# Patient Record
Sex: FEMALE | Race: BLACK OR AFRICAN AMERICAN | Employment: FULL TIME | URBAN - METROPOLITAN AREA
[De-identification: names, ages, dates, MRNs, and addresses within clinical notes are randomized per-mention and may not be internally consistent; named-entity substitution may affect disease eponyms.]

---

## 2017-03-01 ENCOUNTER — OP HISTORICAL/CONVERTED ENCOUNTER (OUTPATIENT)
Dept: OTHER | Age: 52
End: 2017-03-01

## 2020-03-06 ENCOUNTER — OP HISTORICAL/CONVERTED ENCOUNTER (OUTPATIENT)
Dept: OTHER | Age: 55
End: 2020-03-06

## 2020-04-02 ENCOUNTER — OP HISTORICAL/CONVERTED ENCOUNTER (OUTPATIENT)
Dept: OTHER | Age: 55
End: 2020-04-02

## 2020-05-01 ENCOUNTER — OP HISTORICAL/CONVERTED ENCOUNTER (OUTPATIENT)
Dept: OTHER | Age: 55
End: 2020-05-01

## 2020-08-03 ENCOUNTER — OFFICE VISIT (OUTPATIENT)
Dept: OBGYN CLINIC | Age: 55
End: 2020-08-03
Payer: COMMERCIAL

## 2020-08-03 VITALS
WEIGHT: 271 LBS | BODY MASS INDEX: 36.7 KG/M2 | HEIGHT: 72 IN | SYSTOLIC BLOOD PRESSURE: 120 MMHG | DIASTOLIC BLOOD PRESSURE: 82 MMHG

## 2020-08-03 DIAGNOSIS — R10.2 PELVIC PAIN: Primary | ICD-10-CM

## 2020-08-03 DIAGNOSIS — R31.9 URINARY TRACT INFECTION WITH HEMATURIA, SITE UNSPECIFIED: ICD-10-CM

## 2020-08-03 DIAGNOSIS — N39.0 URINARY TRACT INFECTION WITH HEMATURIA, SITE UNSPECIFIED: ICD-10-CM

## 2020-08-03 DIAGNOSIS — N76.0 ACUTE VAGINITIS: ICD-10-CM

## 2020-08-03 DIAGNOSIS — R30.0 DYSURIA: ICD-10-CM

## 2020-08-03 LAB
BILIRUB UR QL STRIP: NEGATIVE
GLUCOSE UR-MCNC: NEGATIVE MG/DL
KETONES P FAST UR STRIP-MCNC: NEGATIVE MG/DL
PH UR STRIP: 7 [PH] (ref 4.6–8)
PROT UR QL STRIP: NEGATIVE
SP GR UR STRIP: 1.02 (ref 1–1.03)
UA UROBILINOGEN AMB POC: NORMAL (ref 0.2–1)
URINALYSIS CLARITY POC: CLEAR
URINALYSIS COLOR POC: YELLOW
URINE BLOOD POC: NORMAL
URINE LEUKOCYTES POC: NORMAL
URINE NITRITES POC: NEGATIVE

## 2020-08-03 PROCEDURE — 81001 URINALYSIS AUTO W/SCOPE: CPT | Performed by: OBSTETRICS & GYNECOLOGY

## 2020-08-03 PROCEDURE — 99203 OFFICE O/P NEW LOW 30 MIN: CPT | Performed by: OBSTETRICS & GYNECOLOGY

## 2020-08-03 RX ORDER — PHENOL/SODIUM PHENOLATE
40 AEROSOL, SPRAY (ML) MUCOUS MEMBRANE DAILY
COMMUNITY

## 2020-08-03 RX ORDER — PHENAZOPYRIDINE HYDROCHLORIDE 200 MG/1
200 TABLET, FILM COATED ORAL
Qty: 9 TAB | Refills: 0 | Status: SHIPPED | OUTPATIENT
Start: 2020-08-03 | End: 2020-08-06

## 2020-08-03 RX ORDER — MONTELUKAST SODIUM 10 MG/1
10 TABLET ORAL DAILY
COMMUNITY

## 2020-08-03 RX ORDER — ALBUTEROL SULFATE 2.5 MG/.5ML
SOLUTION RESPIRATORY (INHALATION) ONCE
COMMUNITY

## 2020-08-03 RX ORDER — HYDROCHLOROTHIAZIDE 25 MG/1
25 TABLET ORAL DAILY
COMMUNITY

## 2020-08-03 RX ORDER — TERCONAZOLE 4 MG/G
1 CREAM VAGINAL
Qty: 45 G | Refills: 0 | Status: SHIPPED | OUTPATIENT
Start: 2020-08-03

## 2020-08-03 RX ORDER — CIPROFLOXACIN 500 MG/1
500 TABLET ORAL 2 TIMES DAILY
Qty: 20 TAB | Refills: 0 | Status: SHIPPED | OUTPATIENT
Start: 2020-08-03 | End: 2020-08-13

## 2020-08-03 NOTE — PATIENT INSTRUCTIONS
Vaginal Yeast Infection: Care Instructions Your Care Instructions A vaginal yeast infection is caused by too many yeast cells in the vagina. This is common in women of all ages. Itching, vaginal discharge and irritation, and other symptoms can bother you. But yeast infections don't often cause other health problems. Some medicines can increase your risk of getting a yeast infection. These include antibiotics, birth control pills, hormones, and steroids. You may also be more likely to get a yeast infection if you are pregnant, have diabetes, douche, or wear tight clothes. With treatment, most yeast infections get better in 2 to 3 days. Follow-up care is a key part of your treatment and safety. Be sure to make and go to all appointments, and call your doctor if you are having problems. It's also a good idea to know your test results and keep a list of the medicines you take. How can you care for yourself at home? · Take your medicines exactly as prescribed. Call your doctor if you think you are having a problem with your medicine. · Ask your doctor about over-the-counter (OTC) medicines for yeast infections. They may cost less than prescription medicines. If you use an OTC treatment, read and follow all instructions on the label. · Do not use tampons while using a vaginal cream or suppository. The tampons can absorb the medicine. Use pads instead. · Wear loose cotton clothing. Do not wear nylon or other fabric that holds body heat and moisture close to the skin. · Try sleeping without underwear. · Do not scratch. Relieve itching with a cold pack or a cool bath. · Do not wash your vaginal area more than once a day. Use plain water or a mild, unscented soap. Air-dry the vaginal area. · Change out of wet swimsuits after swimming. · Do not have sex until you have finished your treatment. · Do not douche. When should you call for help? Call your doctor now or seek immediate medical care if: · You have unexpected vaginal bleeding. · You have new or increased pain in your vagina or pelvis. Watch closely for changes in your health, and be sure to contact your doctor if: 
· You have a fever. · You are not getting better after 2 days. · Your symptoms come back after you finish your medicines. Where can you learn more? Go to http://crissy-kris.info/ Enter K780 in the search box to learn more about \"Vaginal Yeast Infection: Care Instructions. \" Current as of: November 8, 2019               Content Version: 12.5 © 4218-4655 [employer]. Care instructions adapted under license by [employer] (which disclaims liability or warranty for this information). If you have questions about a medical condition or this instruction, always ask your healthcare professional. [address] any warranty or liability for your use of this information. Pelvic Pain: Care Instructions Your Care Instructions Pelvic pain, or pain in the lower belly, can have many causes. Often pelvic pain is not serious and gets better in a few days. If your pain continues or gets worse, you may need tests and treatment. Tell your doctor about any new symptoms. These may be signs of a serious problem. Follow-up care is a key part of your treatment and safety. Be sure to make and go to all appointments, and call your doctor if you are having problems. It's also a good idea to know your test results and keep a list of the medicines you take. How can you care for yourself at home? · Rest until you feel better. Lie down, and raise your legs by placing a pillow under your knees. · Drink plenty of fluids. You may find that small, frequent sips are easier on your stomach than if you drink a lot at once. Avoid drinks with carbonation or caffeine, such as soda pop, tea, or coffee. · Try eating several small meals instead of 2 or 3 large ones.  Eat mild foods, such as rice, dry toast or crackers, bananas, and applesauce. Avoid fatty and spicy foods, other fruits, and alcohol until 48 hours after your symptoms have gone away. · Take an over-the-counter pain medicine, such as acetaminophen (Tylenol), ibuprofen (Advil, Motrin), or naproxen (Aleve). Read and follow all instructions on the label. · Do not take two or more pain medicines at the same time unless the doctor told you to. Many pain medicines have acetaminophen, which is Tylenol. Too much acetaminophen (Tylenol) can be harmful. · You can put a heating pad, a warm cloth, or moist heat on your belly to relieve pain. When should you call for help? Call your doctor now or seek immediate medical care if: 
· You have a new or higher fever. · You have unusual vaginal bleeding. · You have new or worse belly or pelvic pain. · You have vaginal discharge that has increased in amount or smells bad. Watch closely for changes in your health, and be sure to contact your doctor if: 
· You do not get better as expected. Where can you learn more? Go to http://crissy-kris.info/ Enter [phone] in the search box to learn more about \"Pelvic Pain: Care Instructions. \" Current as of: November 8, 2019               Content Version: 12.5 © 3222-0624 [employer]. Care instructions adapted under license by [employer] (which disclaims liability or warranty for this information). If you have questions about a medical condition or this instruction, always ask your healthcare professional. [place] [address] any warranty or liability for your use of this information.

## 2020-08-03 NOTE — PROGRESS NOTES
Tyler Garcia is a 62 y.o. female, No obstetric history on file., No LMP recorded. Patient has had a hysterectomy. , who presents today for the following:  Chief Complaint   Patient presents with    Pelvic Pain     Pain on right side. No Known Allergies    Current Outpatient Medications   Medication Sig    albuterol sulfate (PROVENTIL;VENTOLIN) 2.5 mg/0.5 mL nebu nebulizer solution by Nebulization route once.  montelukast (Singulair) 10 mg tablet Take 10 mg by mouth daily.  hydroCHLOROthiazide (HYDRODIURIL) 25 mg tablet Take 25 mg by mouth daily.  Omeprazole delayed release (PRILOSEC D/R) 20 mg tablet Take 40 mg by mouth daily.  ciprofloxacin HCl (CIPRO) 500 mg tablet Take 1 Tab by mouth two (2) times a day for 10 days.  phenazopyridine (PYRIDIUM) 200 mg tablet Take 1 Tab by mouth three (3) times daily as needed for Pain for up to 3 days.  terconazole (TERAZOL 7) 0.4 % vaginal cream Insert 1 Applicator into vagina nightly. No current facility-administered medications for this visit.         Past Medical History:   Diagnosis Date    History of total abdominal hysterectomy        Past Surgical History:   Procedure Laterality Date    HX TOTAL ABDOMINAL HYSTERECTOMY         Family History   Problem Relation Age of Onset    Breast Cancer Mother     Lung Cancer Mother     Hypertension Father     Diabetes Father        Social History     Socioeconomic History    Marital status: UNKNOWN     Spouse name: Not on file    Number of children: Not on file    Years of education: Not on file    Highest education level: Not on file   Occupational History    Not on file   Social Needs    Financial resource strain: Not on file    Food insecurity     Worry: Not on file     Inability: Not on file    Transportation needs     Medical: Not on file     Non-medical: Not on file   Tobacco Use    Smoking status: Never Smoker    Smokeless tobacco: Never Used   Substance and Sexual Activity    Alcohol use: Not Currently    Drug use: Never    Sexual activity: Yes     Partners: Male     Birth control/protection: Surgical   Lifestyle    Physical activity     Days per week: Not on file     Minutes per session: Not on file    Stress: Not on file   Relationships    Social connections     Talks on phone: Not on file     Gets together: Not on file     Attends Yarsani service: Not on file     Active member of club or organization: Not on file     Attends meetings of clubs or organizations: Not on file     Relationship status: Not on file    Intimate partner violence     Fear of current or ex partner: Not on file     Emotionally abused: Not on file     Physically abused: Not on file     Forced sexual activity: Not on file   Other Topics Concern    Not on file   Social History Narrative    Not on file         HPI   HPI  Reported by patient. Location: lower right abdominal   Onset/Timing: 3 months  Duration: 3 months  Quality: aching; cramping; pressure; fullness/bloating , daily  Severity: interferes with normal activities   Alleviating Factors:  Rest  Aggravating Factors: urination, movement  Associated Symptoms: no back pain; no chills; no constipation; no diarrhea; no vaginal discharge; no pain with urination; normal emptying of bladder; no feelings of urgency; + urinary frequency, no blood in the urine; normal libido; no fever; no nausea; no vomiting; no nocturia; no sexual abuse; no ectopic pregnancies; no endometriosis; no urinary frequency; no vaginal itching or irritation; no dyspareunia; Review of Systems   Constitutional: Negative. Respiratory: Negative. Cardiovascular: Negative. Gastrointestinal: Positive for abdominal pain. Genitourinary: Positive for dysuria, frequency and urgency. Pelvic pain   Musculoskeletal: Negative. Skin: Negative. Neurological: Negative. Endo/Heme/Allergies: Negative. Psychiatric/Behavioral: Negative.     All other systems reviewed and are negative. /82 (BP 1 Location: Left arm, BP Patient Position: Sitting)   Ht 6' (1.829 m)   Wt 271 lb (122.9 kg)   BMI 36.75 kg/m²    OBGyn Exam PE:  Constitutional: General Appearance: healthy-appearing, well-nourished, well-developed, and well groomed. Psychiatric: Orientation: to time, place, and person. Mood and Affect: normal mood and affect and appropriate and active and alert. Abdomen: Auscultation/Inspection/Palpation: +tenderness in RLQ and mass and non-distended. Hernia: none palpated. Female Genitalia: Vulva: no masses, atrophy, or lesions. Bladder/Urethra: no urethral discharge or mass and normal meatus and bladder non distended. Vagina no tenderness, erythema, cystocele, rectocele, + abnormal vaginal discharge, or vesicle(s) or ulcers. Cervix: absent     Uterus: absent    Adnexa/Parametria: no adnexal tenderness. 1. Pelvic pain    - US PELV NON OBS; Future    2. Dysuria    - CULTURE, URINE; Future    3. Urinary tract infection with hematuria, site unspecified      4. Acute vaginitis    - terconazole (TERAZOL 7) 0.4 % vaginal cream; Insert 1 Applicator into vagina nightly. Dispense: 45 g; Refill: 0  - NUSWAB VAGINITIS PLUS (VG+) WITH CANDIDA (SIX SPECIES)        Follow-up and Dispositions    · Return in about 4 weeks (around 8/31/2020) for gyn.

## 2020-08-09 LAB
A VAGINAE DNA VAG QL NAA+PROBE: ABNORMAL SCORE
BVAB2 DNA VAG QL NAA+PROBE: ABNORMAL SCORE
C ALBICANS DNA VAG QL NAA+PROBE: NEGATIVE
C GLABRATA DNA VAG QL NAA+PROBE: POSITIVE
C KRUSEI DNA VAG QL NAA+PROBE: NEGATIVE
C LUSITANIAE DNA VAG QL NAA+PROBE: NEGATIVE
C TRACH DNA VAG QL NAA+PROBE: NEGATIVE
CANDIDA DNA VAG QL NAA+PROBE: NEGATIVE
MEGA1 DNA VAG QL NAA+PROBE: ABNORMAL SCORE
N GONORRHOEA DNA VAG QL NAA+PROBE: NEGATIVE
T VAGINALIS DNA VAG QL NAA+PROBE: NEGATIVE

## 2020-08-13 ENCOUNTER — OP HISTORICAL/CONVERTED ENCOUNTER (OUTPATIENT)
Dept: OTHER | Age: 55
End: 2020-08-13

## 2020-09-08 ENCOUNTER — ED HISTORICAL/CONVERTED ENCOUNTER (OUTPATIENT)
Dept: OTHER | Age: 55
End: 2020-09-08

## 2020-09-13 ENCOUNTER — APPOINTMENT (OUTPATIENT)
Dept: GENERAL RADIOLOGY | Age: 55
End: 2020-09-13
Attending: EMERGENCY MEDICINE
Payer: COMMERCIAL

## 2020-09-13 ENCOUNTER — HOSPITAL ENCOUNTER (EMERGENCY)
Age: 55
Discharge: HOME | End: 2020-09-13
Attending: EMERGENCY MEDICINE
Payer: COMMERCIAL

## 2020-09-13 ENCOUNTER — APPOINTMENT (OUTPATIENT)
Dept: CT IMAGING | Age: 55
End: 2020-09-13
Attending: EMERGENCY MEDICINE
Payer: COMMERCIAL

## 2020-09-13 VITALS
HEART RATE: 69 BPM | BODY MASS INDEX: 37.93 KG/M2 | WEIGHT: 280 LBS | SYSTOLIC BLOOD PRESSURE: 110 MMHG | OXYGEN SATURATION: 97 % | HEIGHT: 72 IN | TEMPERATURE: 98 F | DIASTOLIC BLOOD PRESSURE: 72 MMHG | RESPIRATION RATE: 15 BRPM

## 2020-09-13 DIAGNOSIS — T83.9XXA COMPLICATION OF URINARY STENT, INITIAL ENCOUNTER (HCC): Primary | ICD-10-CM

## 2020-09-13 DIAGNOSIS — R31.9 URINARY TRACT INFECTION WITH HEMATURIA, SITE UNSPECIFIED: ICD-10-CM

## 2020-09-13 DIAGNOSIS — N39.0 URINARY TRACT INFECTION WITH HEMATURIA, SITE UNSPECIFIED: ICD-10-CM

## 2020-09-13 LAB
ALBUMIN SERPL-MCNC: 3.3 G/DL (ref 3.5–5)
ALBUMIN/GLOB SERPL: 0.8 {RATIO} (ref 1.1–2.2)
ALP SERPL-CCNC: 67 U/L (ref 45–117)
ALT SERPL-CCNC: 26 U/L (ref 12–78)
ANION GAP SERPL CALC-SCNC: 10 MMOL/L (ref 5–15)
AST SERPL W P-5'-P-CCNC: 25 U/L (ref 15–37)
BASOPHILS # BLD: 0 K/UL (ref 0–0.1)
BASOPHILS NFR BLD: 0 % (ref 0–1)
BILIRUB SERPL-MCNC: 0.5 MG/DL (ref 0.2–1)
BUN SERPL-MCNC: 25 MG/DL (ref 6–20)
BUN/CREAT SERPL: 27 (ref 12–20)
CA-I BLD-MCNC: 9.1 MG/DL (ref 8.5–10.1)
CHLORIDE SERPL-SCNC: 100 MMOL/L (ref 97–108)
CO2 SERPL-SCNC: 30 MMOL/L (ref 21–32)
CREAT SERPL-MCNC: 0.91 MG/DL (ref 0.55–1.02)
DIFFERENTIAL METHOD BLD: NORMAL
EOSINOPHIL # BLD: 0.1 K/UL (ref 0–0.4)
EOSINOPHIL NFR BLD: 1 % (ref 0–7)
ERYTHROCYTE [DISTWIDTH] IN BLOOD BY AUTOMATED COUNT: 14.2 % (ref 11.5–14.5)
GLOBULIN SER CALC-MCNC: 4.2 G/DL (ref 2–4)
GLUCOSE SERPL-MCNC: 92 MG/DL (ref 65–100)
HCT VFR BLD AUTO: 36.9 % (ref 35–47)
HGB BLD-MCNC: 12 % (ref 11.5–16)
IMM GRANULOCYTES # BLD AUTO: 0 K/UL (ref 0–0.04)
IMM GRANULOCYTES NFR BLD AUTO: 0 % (ref 0–0.5)
LYMPHOCYTES # BLD: 3.1 K/UL (ref 0.8–3.5)
LYMPHOCYTES NFR BLD: 38 % (ref 12–49)
MCH RBC QN AUTO: 28.9 PG (ref 26–34)
MCHC RBC AUTO-ENTMCNC: 32.5 G/DL (ref 30–36.5)
MCV RBC AUTO: 88.9 FL (ref 80–99)
MONOCYTES # BLD: 0.8 K/UL (ref 0–1)
MONOCYTES NFR BLD: 9 % (ref 5–13)
NEUTS SEG # BLD: 4.3 K/UL (ref 1.8–8)
NEUTS SEG NFR BLD: 52 % (ref 32–75)
PLATELET # BLD AUTO: 271 K/UL (ref 150–400)
PMV BLD AUTO: 10.6 FL (ref 8.9–12.9)
POTASSIUM SERPL-SCNC: 3 MMOL/L (ref 3.5–5.1)
PROT SERPL-MCNC: 7.5 G/DL (ref 6.4–8.2)
RBC # BLD AUTO: 4.15 M/UL (ref 3.8–5.2)
SODIUM SERPL-SCNC: 140 MMOL/L (ref 136–145)
WBC # BLD AUTO: 8.3 K/UL (ref 3.6–11)

## 2020-09-13 PROCEDURE — 74018 RADEX ABDOMEN 1 VIEW: CPT

## 2020-09-13 PROCEDURE — 80053 COMPREHEN METABOLIC PANEL: CPT

## 2020-09-13 PROCEDURE — 99284 EMERGENCY DEPT VISIT MOD MDM: CPT

## 2020-09-13 PROCEDURE — 74176 CT ABD & PELVIS W/O CONTRAST: CPT

## 2020-09-13 PROCEDURE — 85025 COMPLETE CBC W/AUTO DIFF WBC: CPT

## 2020-09-13 PROCEDURE — 36415 COLL VENOUS BLD VENIPUNCTURE: CPT

## 2020-09-13 RX ORDER — CEPHALEXIN 500 MG/1
500 CAPSULE ORAL 3 TIMES DAILY
Qty: 21 CAP | Refills: 0 | Status: SHIPPED | OUTPATIENT
Start: 2020-09-13 | End: 2020-09-20

## 2020-09-13 NOTE — ED PROVIDER NOTES
77-year-old female that had a stent placed 1 week ago for ureteral lithiasis [place].  She was told to remove it 2 days ago by pulling on the string when she did so the string broke now she is complaining of urinary incontinence. She denies pain hematuria fever chills or sweats back pain abdominal pain or any other constitutional symptoms.            Past Medical History:   Diagnosis Date    Asthma     History of total abdominal hysterectomy        Past Surgical History:   Procedure Laterality Date    HX TOTAL ABDOMINAL HYSTERECTOMY      IR CHANGE INTERNAL URETERAL STENT SI           Family History:   Problem Relation Age of Onset    Breast Cancer Mother     Lung Cancer Mother     Hypertension Father     Diabetes Father        Social History     Socioeconomic History    Marital status:      Spouse name: Not on file    Number of children: Not on file    Years of education: Not on file    Highest education level: Not on file   Occupational History    Not on file   Social Needs    Financial resource strain: Not on file    Food insecurity     Worry: Not on file     Inability: Not on file    Transportation needs     Medical: Not on file     Non-medical: Not on file   Tobacco Use    Smoking status: Never Smoker    Smokeless tobacco: Never Used   Substance and Sexual Activity    Alcohol use: Not Currently    Drug use: Never    Sexual activity: Yes     Partners: Male     Birth control/protection: Surgical   Lifestyle    Physical activity     Days per week: Not on file     Minutes per session: Not on file    Stress: Not on file   Relationships    Social connections     Talks on phone: Not on file     Gets together: Not on file     Attends Confucianist service: Not on file     Active member of club or organization: Not on file     Attends meetings of clubs or organizations: Not on file     Relationship status: Not on file    Intimate partner violence     Fear of current or ex partner: Not on file     Emotionally abused: Not on file     Physically abused: Not on file     Forced sexual activity: Not on file   Other Topics Concern    Not on file   Social History Narrative    Not on file         ALLERGIES: Patient has no known allergies. Review of Systems   Constitutional: Negative for activity change, chills and fever. HENT: Negative for ear pain, nosebleeds, rhinorrhea and sore throat. Eyes: Negative for pain and visual disturbance. Respiratory: Negative for chest tightness and wheezing. Cardiovascular: Negative for chest pain and palpitations. Gastrointestinal: Negative for abdominal pain, diarrhea, nausea and vomiting. Endocrine: Negative for heat intolerance, polydipsia and polyuria. Genitourinary: Positive for flank pain and pelvic pain. Negative for difficulty urinating, dysuria, frequency, hematuria, vaginal discharge and vaginal pain. Musculoskeletal: Negative for back pain, joint swelling and neck pain. Skin: Negative for rash. Allergic/Immunologic: Negative. Neurological: Negative for dizziness, seizures, syncope, weakness and headaches. Hematological: Negative for adenopathy. Psychiatric/Behavioral: Negative for behavioral problems and suicidal ideas. The patient is not nervous/anxious. Vitals:    09/13/20 0146   BP: (!) 141/85   Pulse: 74   Resp: 16   Temp: 98 °F (36.7 °C)   SpO2: 99%   Weight: 127 kg (280 lb)   Height: 5' 11.5\" (1.816 m)            Physical Exam  Vitals signs and nursing note reviewed. Constitutional:       General: She is not in acute distress. Appearance: Normal appearance. HENT:      Head: Normocephalic and atraumatic. Nose: Nose normal.      Mouth/Throat:      Mouth: Mucous membranes are moist.   Eyes:      Extraocular Movements: Extraocular movements intact. Pupils: Pupils are equal, round, and reactive to light. Neck:      Musculoskeletal: Normal range of motion and neck supple. Cardiovascular:      Rate and Rhythm: Normal rate and regular rhythm. Pulses: Normal pulses. Pulmonary:      Effort: Pulmonary effort is normal. No respiratory distress. Breath sounds: Normal breath sounds. Abdominal:      General: Abdomen is flat. Bowel sounds are normal. There is no distension. Palpations: Abdomen is soft. Tenderness: There is no abdominal tenderness. Musculoskeletal: Normal range of motion. General: No swelling, tenderness or deformity. Right lower leg: No edema. Left lower leg: No edema. Skin:     General: Skin is warm and dry. Capillary Refill: Capillary refill takes less than 2 seconds. Neurological:      General: No focal deficit present. Mental Status: She is alert and oriented to person, place, and time. Mental status is at baseline.    Psychiatric:         Mood and Affect: Mood normal.         Behavior: Behavior normal.          MDM  Number of Diagnoses or Management Options     Amount and/or Complexity of Data Reviewed  Clinical lab tests: ordered and reviewed  Tests in the radiology section of CPT®: ordered and reviewed           Procedures

## 2020-09-13 NOTE — ED TRIAGE NOTES
Urinary stent placed for kidney stone and when she went to remove stent only the string came out and now she is incontinent.

## 2020-09-13 NOTE — ED NOTES
EMERGENCY DEPARTMENT HISTORY AND PHYSICAL EXAM      Date: 9/13/2020  Patient Name: Soni Yanez    History of Presenting Illness     Chief Complaint   Patient presents with    (LUTS) Lower Urinary Tract Symptoms       History Provided By: Patient    HPI: Soni Yanez, 62 y.o. female with a past medical history significant No significant past medical history presents to the ED with cc of urethral stent string came undone when trying to remove. See Dr Johnnie Avila preious note for further. There are no other complaints, changes, or physical findings at this time. PCP: Yadi Godinez MD    No current facility-administered medications on file prior to encounter. Current Outpatient Medications on File Prior to Encounter   Medication Sig Dispense Refill    albuterol sulfate (PROVENTIL;VENTOLIN) 2.5 mg/0.5 mL nebu nebulizer solution by Nebulization route once.  montelukast (Singulair) 10 mg tablet Take 10 mg by mouth daily.  hydroCHLOROthiazide (HYDRODIURIL) 25 mg tablet Take 25 mg by mouth daily.  Omeprazole delayed release (PRILOSEC D/R) 20 mg tablet Take 40 mg by mouth daily.  terconazole (TERAZOL 7) 0.4 % vaginal cream Insert 1 Applicator into vagina nightly.  45 g 0       Past History     Past Medical History:  Past Medical History:   Diagnosis Date    Asthma     History of total abdominal hysterectomy        Past Surgical History:  Past Surgical History:   Procedure Laterality Date    HX TOTAL ABDOMINAL HYSTERECTOMY      IR CHANGE INTERNAL URETERAL STENT SI         Family History:  Family History   Problem Relation Age of Onset    Breast Cancer Mother     Lung Cancer Mother     Hypertension Father     Diabetes Father        Social History:  Social History     Tobacco Use    Smoking status: Never Smoker    Smokeless tobacco: Never Used   Substance Use Topics    Alcohol use: Not Currently    Drug use: Never       Allergies:  No Known Allergies      Review of Systems Review of Systems   Constitutional: Negative for activity change. Genitourinary: Positive for hematuria (incontinence. FB stent in place. ). All other systems reviewed and are negative. Physical Exam   Physical Exam  Vitals signs and nursing note reviewed. Constitutional:       Appearance: Normal appearance. She is obese. HENT:      Head: Normocephalic and atraumatic. Mouth/Throat:      Mouth: Mucous membranes are moist.   Eyes:      Pupils: Pupils are equal, round, and reactive to light. Cardiovascular:      Rate and Rhythm: Normal rate and regular rhythm. Pulmonary:      Effort: Pulmonary effort is normal.   Genitourinary:     General: Normal vulva. Comments: Stent still intact from the urethra. Musculoskeletal: Normal range of motion. General: No swelling or tenderness. Skin:     General: Skin is warm and dry. Capillary Refill: Capillary refill takes less than 2 seconds. Neurological:      General: No focal deficit present. Mental Status: She is alert and oriented to person, place, and time. Diagnostic Study Results     Labs -     Recent Results (from the past 12 hour(s))   CBC WITH AUTOMATED DIFF    Collection Time: 09/13/20  6:15 AM   Result Value Ref Range    WBC 8.3 3.6 - 11.0 K/uL    RBC 4.15 3.80 - 5.20 M/uL    HGB 12.0 11.5 - 16.0 %    HCT 36.9 35.0 - 47.0 %    MCV 88.9 80.0 - 99.0 FL    MCH 28.9 26.0 - 34.0 PG    MCHC 32.5 30.0 - 36.5 g/dL    RDW 14.2 11.5 - 14.5 %    PLATELET 882 069 - 939 K/uL    MPV 10.6 8.9 - 12.9 FL    NEUTROPHILS 52 32 - 75 %    LYMPHOCYTES 38 12 - 49 %    MONOCYTES 9 5 - 13 %    EOSINOPHILS 1 0 - 7 %    BASOPHILS 0 0 - 1 %    IMMATURE GRANULOCYTES 0 0.0 - 0.5 %    ABS. NEUTROPHILS 4.3 1.8 - 8.0 K/UL    ABS. LYMPHOCYTES 3.1 0.8 - 3.5 K/UL    ABS. MONOCYTES 0.8 0.0 - 1.0 K/UL    ABS. EOSINOPHILS 0.1 0.0 - 0.4 K/UL    ABS. BASOPHILS 0.0 0.0 - 0.1 K/UL    ABS. IMM.  GRANS. 0.0 0.00 - 0.04 K/UL    DF AUTOMATED Radiologic Studies -   CT ABD PELV WO CONT   Final Result   Impression: Distally migrated right ureteral stent as described               XR ABD (KUB)   Final Result   Impression: Partially visualized right ureteral stent as described        CT Results  (Last 48 hours)               09/13/20 0552  CT ABD PELV WO CONT Final result    Impression:  Impression: Distally migrated right ureteral stent as described                   Narrative:  Exam: Abdomen and pelvis CT without intravenous contrast       Comparison: Plain radiograph today       Dose reduction: All CT scans at this facility are performed using dose reduction   optimization techniques as appropriate to perform the exam including the   following: automated exposure control, adjustments of the mA and/or kV according   to patient size, or use of iterative reconstruction technique. Findings: A right ureteral stent has migrated distally, the distal J being   within, or exiting from, the urethra. The proximal J is in the proximal third of   the right ureter. There is mild right ureteropelvicaliectasis. No evident   ureteral stone or extrinsic obstructing mass. There is nonobstructing left   nephrolithiasis. Left upper renal collecting system unremarkable. Bladder   nondistended. No free or loculated fluid. Remote hysterectomy. Apparent stones   within a nondistended gallbladder. No biliary or pancreatic ductal dilatation. Liver and pancreas are unremarkable. Adrenals unremarkable. Spleen unremarkable. Normal appendix. Allowing for the absence of oral contrast, bowel appears   unremarkable. In particular, negative for bowel dilatation, bowel wall   thickening, pneumatosis intestinalis, mesenteroportal venous gas, or free   subdiaphragmatic air. CXR Results  (Last 48 hours)    None            Medical Decision Making   I am the first provider for this patient.     I reviewed the vital signs, available nursing notes, past medical history, past surgical history, family history and social history. Vital Signs-Reviewed the patient's vital signs. Patient Vitals for the past 12 hrs:   Temp Pulse Resp BP SpO2   09/13/20 0815  69 15 110/72 97 %   09/13/20 0617  71 19 112/69 98 %   09/13/20 0146 98 °F (36.7 °C) 74 16 (!) 141/85 99 %       Records Reviewed: Nursing Notes    Provider Notes (Medical Decision Making):   Dr Blaire Moreland saw pt in ED. Wishes to dc and start Keflex for uti prevention. Stent removed with no complication. Pt tolerated well. See his note for further. ED Course:   Initial assessment performed. The patients presenting problems have been discussed, and they are in agreement with the care plan formulated and outlined with them. I have encouraged them to ask questions as they arise throughout their visit. PLAN:  1. Current Discharge Medication List      START taking these medications    Details   cephALEXin (Keflex) 500 mg capsule Take 1 Cap by mouth three (3) times daily for 7 days. Indications: urinary tract infection prevention  Qty: 21 Cap, Refills: 0         CONTINUE these medications which have NOT CHANGED    Details   albuterol sulfate (PROVENTIL;VENTOLIN) 2.5 mg/0.5 mL nebu nebulizer solution by Nebulization route once. montelukast (Singulair) 10 mg tablet Take 10 mg by mouth daily. hydroCHLOROthiazide (HYDRODIURIL) 25 mg tablet Take 25 mg by mouth daily. Omeprazole delayed release (PRILOSEC D/R) 20 mg tablet Take 40 mg by mouth daily. terconazole (TERAZOL 7) 0.4 % vaginal cream Insert 1 Applicator into vagina nightly. Qty: 45 g, Refills: 0    Associated Diagnoses: Acute vaginitis           2. Follow-up Information     Follow up With Specialties Details Why Contact Info    Matthew Burris MD Urology In 2 days  [address] [place] [id number] [phone]          Return to ED if worse     Diagnosis     Clinical Impression:   1.  Complication of urinary stent, initial encounter ([facility]    2.  Urinary tract infection with hematuria, site unspecified

## 2020-09-13 NOTE — CONSULTS
UROLOGY HISTORY AND PHYSICAL    Patient: Radha Porter MRN: [id number]  SSN: [id number]    YOB: 1965  Age: 62 y.o. Sex: female          Date of Encounter:  September 13, 2020  Chief Complaint:    Reason for consult:  Pre-existing Urology Patient:            Assessment/Plan:  S/p right ureteroscopy and stone extraction about 9 days ago in [place]. Stent did not come out when self removal attempted. Resultant incontinence. Stent had migrated out the urethra. I removed it at the bedside. No further right sided stone. Left sided stones are tiny and not currently significant. Rx 3 days of abx. History of Present Illness:  Patient is a 62 y.o. female admitted 9/13/2020 to the [facility] for . She notes around 9/4/2020 she presented to a hospital in [place], [place] with a right ureteral stone and pain. She was told it was too big to pass and had urgent ureteroscopic stone treatment. She was told the stone was treated and she had a stent placed with a string. As instructed she tried to remove it 1 week later but had trouble locating it. Her daughter helped her on 9/11/2020 and the string came out without the stent. Since then she has been incontinence requiring pads. She presented to the ER. Past Medical History:  No Known Allergies   Prior to Admission medications    Medication Sig Start Date End Date Taking? Authorizing Provider   albuterol sulfate (PROVENTIL;VENTOLIN) 2.5 mg/0.5 mL nebu nebulizer solution by Nebulization route once. Provider, Historical   montelukast (Singulair) 10 mg tablet Take 10 mg by mouth daily. Provider, Historical   hydroCHLOROthiazide (HYDRODIURIL) 25 mg tablet Take 25 mg by mouth daily. Provider, Historical   Omeprazole delayed release (PRILOSEC D/R) 20 mg tablet Take 40 mg by mouth daily. Provider, Historical   terconazole (TERAZOL 7) 0.4 % vaginal cream Insert 1 Applicator into vagina nightly.  8/3/20   Regla Mazariegos MD PMHx:  has a past medical history of Asthma and History of total abdominal hysterectomy. PSurgHx:  has a past surgical history that includes hx total abdominal hysterectomy and ir change internal ureteral stent si. PSocHx:  reports that she has never smoked. She has never used smokeless tobacco. She reports previous alcohol use. She reports that she does not use drugs. FamilyHX: mother passed with cancer  ROS:  Admission ROS by No admitting provider for patient encounter. from 2020 were reviewed with the patient and changes (other than per HPI) include: none. All 12 systems were reviewed and were otherwise negative. Physical Exam:            Vitals[de-identified]    Temp (24hrs), Av °F (36.7 °C), Min:98 °F (36.7 °C), Max:98 °F (36.7 °C)   Blood pressure 110/72, pulse 69, temperature 98 °F (36.7 °C), resp. rate 15, height 5' 11.5\" (1.816 m), weight 127 kg (280 lb), SpO2 97 %. I&O's:    No intake/output data recorded. General Well developed, NAD, obese   Conjunctiva/Lids Normal without gross defects   Pupil/Iris Pupils equal, round, reactive   External Ears/Nose No lesions or deformities   Hearing  Grossly intact   Neck Supple without masses   Thyroid No nodules, masses, tenderness, or enlargement   Respiratory Effort Breathing easily   Chest Palpation No tactile fremitus   Abdominal Aorta No enlargement or bruits   Lower extremity No edema   Abdomen / Flank Soft, non tender, without masses, no CVA tenderness   Liver / Spleen No organomegaly   Hernia  No ventral hernia    Incontinence with brown tinged urine at vulva.   Urethra with ureteral stent visible - removed   Lymph No adenopathy   Digits/ Nails No clubbing, cyanosis, petechiae   Skin Inspection Warm and dry   Skin Palpation No subcutaneous nodularity   Senesation Grossly without deficits   Judgement / Insight intact   Mood / Affect Normal     Lab Results   Component Value Date/Time    WBC 8.3 2020 06:15 AM    HCT 36.9 2020 06:15 AM    PLATELET 138 87/90/4737 06:15 AM       UA: No results found for: COLOR, APPRN, SPGRU, REFSG, ROBY, PROTU, GLUCU, KETU, BILU, UROU, DOMONIQUE, LEUKU, GLUKE, EPSU, BACTU, WBCU, RBCU, CASTS, UCRY    Cultures:   Xrays: CT-scan of abdomen and pelvis 9/13/2020 without right ureteral or renal stones.   Left nonobstructing stones- punctate      Signed By: Fabby Seth MD  - September 13, 2020

## 2020-09-13 NOTE — DISCHARGE INSTRUCTIONS
Patient Education        Cystoscopy: What to Expect at [address] [facility]     A cystoscopy is a procedure that lets a doctor look inside of the bladder and the urethra. The urethra is the tube that carries urine from the bladder to outside the body. The doctor uses a thin, lighted tool called a cystoscope. Your bladder is filled with fluid. This stretches the bladder so that your doctor can look closely at the inside of your bladder. After the cystoscopy, your urethra may be sore at first, and it may burn when you urinate for the first few days after the procedure. You may feel the need to urinate more often, and your urine may be pink. These symptoms should get better in 1 or 2 days. You will probably be able to go back to most of your usual activities in 1 or 2 days. This care sheet gives you a general idea about how long it will take for you to recover. But each person recovers at a different pace. Follow the steps below to get better as quickly as possible. How can you care for yourself at home? Activity    · Rest when you feel tired. Getting enough sleep will help you recover.     · Try to walk each day. Start by walking a little more than you did the day before. Bit by bit, increase the amount you walk. Walking boosts blood flow and helps prevent pneumonia and constipation.     · Avoid strenuous activities, such as bicycle riding, jogging, weight lifting, or aerobic exercise, until your doctor says it is okay.     · Ask your doctor when you can drive again.     · Most people are able to return to work within 1 or 2 days after the procedure.     · You may shower and take baths as usual.     · Ask your doctor when it is okay for you to have sex. Diet    · You can eat your normal diet. If your stomach is upset, try bland, low-fat foods like plain rice, broiled chicken, toast, and yogurt.     · Drink plenty of fluids (unless your doctor tells you not to).    Medicines    · Take pain medicines exactly as directed. ? If the doctor gave you a prescription medicine for pain, take it as prescribed. ? If you are not taking a prescription pain medicine, ask your doctor if you can take an over-the-counter medicine.     · If you think your pain medicine is making you sick to your stomach:  ? Take your medicine after meals (unless your doctor has told you not to). ? Ask your doctor for a different pain medicine.     · If your doctor prescribed antibiotics, take them as directed. Do not stop taking them just because you feel better. You need to take the full course of antibiotics. Follow-up care is a key part of your treatment and safety. Be sure to make and go to all appointments, and call your doctor if you are having problems. It's also a good idea to know your test results and keep a list of the medicines you take. When should you call for help? Call 911 anytime you think you may need emergency care. For example, call if:    · You passed out (lost consciousness).     · You have severe trouble breathing.     · You have sudden chest pain and shortness of breath, or you cough up blood.     · You have severe belly pain. Call your doctor now or seek immediate medical care if:    · You are sick to your stomach or cannot keep fluids down.     · Your urine is still red or you see blood clots after you have urinated several times.     · You have trouble passing urine or stool, especially if you have pain or swelling in your lower belly.     · You have signs of a blood clot, such as:  ? Pain in your calf, back of the knee, thigh, or groin. ? Redness and swelling in your leg or groin.     · You develop a fever or severe chills.     · You have pain in your back just below your rib cage. This is called flank pain. Watch closely for changes in your health, and be sure to contact your doctor if:    · You have pain or burning when you urinate.  A burning feeling is normal for a day or two after the test, but call if it does not get better.     · You have a frequent urge to urinate but can pass only small amounts of urine.     · Your urine is pink, red, or cloudy, or smells bad. It is normal for the urine to have a pinkish color for a few days after the test, but call if it does not get better. Where can you learn more? Go to http://crissy-kris.info/  Enter C842 in the search box to learn more about \"Cystoscopy: What to Expect at Home. \"  Current as of: June 29, 2020               Content Version: 12.6  © 3686-4115 [employer]. Care instructions adapted under license by [employer] (which disclaims liability or warranty for this information). If you have questions about a medical condition or this instruction, always ask your healthcare professional. [address] any warranty or liability for your use of this information. Patient Education        Ureteral Stent Placement: What to Expect at Home  Your Recovery     A ureteral (say \"you-REE-ter-ul\") stent is a thin, hollow tube that is placed in the ureter to help urine pass from the kidney into the bladder. Ureters are the tubes that connect the kidneys to the bladder. You may have a small amount of blood in your urine for 1 to 3 days after the procedure. While the stent is in place, you may have to urinate more often, feel a sudden need to urinate, or feel like you can't completely empty your bladder. You may feel some pain when you urinate or do strenuous activity. You also may notice a small amount of blood in your urine after strenuous activities. These side effects usually don't prevent people from doing their normal daily activities. You may have a thin string coming out of your urethra. Your urethra is the tube that carries urine from your bladder to outside your body. This string is attached to the stent. Try not to pull on the string.  The doctor will use the string to pull out the stent when you no longer need it. After the procedure, urine may flow better from your kidneys to your bladder. A ureteral stent may be left in place for several days or for as long as several months. Your doctor will take it out when you no longer need it. This care sheet gives you a general idea about how long it will take for you to recover. But each person recovers at a different pace. Follow the steps below to get better as quickly as possible. How can you care for yourself at home? Activity    · Rest when you feel tired. Getting enough sleep will help you recover.     · Avoid strenuous activities, such as bicycle riding, jogging, weight lifting, or aerobic exercise, until your doctor says it is okay.     · Ask your doctor when you can drive again.     · Most people are able to return to work the day after the procedure. If your work requires intense activity, you may feel pain in your kidney area or get tired easily. If this happens, you may need to do less strenuous activities while the stent is in.     · Ask your doctor when it is okay for you to have sex. Diet    · You can eat your normal diet. If your stomach is upset, try bland, low-fat foods like plain rice, broiled chicken, toast, and yogurt.     · Drink plenty of fluids (unless your doctor tells you not to). Medicines    · Your doctor will tell you if and when you can restart your medicines. You will also get instructions about taking any new medicines.     · If you take aspirin or some other blood thinner, ask your doctor if and when to start taking it again. Make sure that you understand exactly what your doctor wants you to do.     · Be safe with medicines. Take pain medicines exactly as directed. ? If the doctor gave you a prescription medicine for pain, take it as prescribed.   ? If you are not taking a prescription pain medicine, ask your doctor if you can take an over-the-counter medicine.     · If you think your pain medicine is making you sick to your stomach:  ? Take your medicine after meals (unless your doctor has told you not to). ? Ask your doctor for a different pain medicine.     · If your doctor prescribed antibiotics, take them as directed. Do not stop taking them just because you feel better. You need to take the full course of antibiotics. Follow-up care is a key part of your treatment and safety. Be sure to make and go to all appointments, and call your doctor if you are having problems. It's also a good idea to know your test results and keep a list of the medicines you take. When should you call for help? Call 911 anytime you think you may need emergency care. For example, call if:    · You passed out (lost consciousness).     · You have severe trouble breathing.     · You have sudden chest pain and shortness of breath, or you cough up blood.     · You have severe belly pain. Call your doctor now or seek immediate medical care if:    · Part or all of the stent comes out of your urethra.     · You have pain that does not get better after you take pain medicine.     · You have symptoms of a urinary infection. For example:  ? You have blood or pus in your urine. ? You have pain in your back just below your rib cage. This is called flank pain. ? You have a fever, chills, or body aches. ? It hurts to urinate. ? You have groin or belly pain.     · You cannot control when you urinate, or you leak urine. Watch closely for changes in your health, and be sure to contact your doctor if you have any problems. Where can you learn more? Go to http://crissy-kris.info/  Enter B869 in the search box to learn more about \"Ureteral Stent Placement: What to Expect at Home. \"  Current as of: June 29, 2020               Content Version: 12.6  © 0089-0287 [employer]. Care instructions adapted under license by [employer] (which disclaims liability or warranty for this information).  If you have questions about a medical condition or this instruction, always ask your healthcare professional. [place] [address] any warranty or liability for your use of this information.

## 2020-09-13 NOTE — ED NOTES
Pt ano x 4, gcs 15, ambulates steady, speech clear and intact, respirations even and unlabored.  Stated understanding of dc instructions

## 2020-09-16 ENCOUNTER — OFFICE VISIT (OUTPATIENT)
Dept: PODIATRY | Age: 55
End: 2020-09-16
Payer: COMMERCIAL

## 2020-09-16 VITALS
HEIGHT: 71 IN | BODY MASS INDEX: 38.72 KG/M2 | HEART RATE: 61 BPM | TEMPERATURE: 96.9 F | OXYGEN SATURATION: 97 % | DIASTOLIC BLOOD PRESSURE: 84 MMHG | SYSTOLIC BLOOD PRESSURE: 127 MMHG | WEIGHT: 276.6 LBS

## 2020-09-16 DIAGNOSIS — M72.2 PLANTAR FASCIITIS OF RIGHT FOOT: Primary | ICD-10-CM

## 2020-09-16 PROCEDURE — 99203 OFFICE O/P NEW LOW 30 MIN: CPT | Performed by: PODIATRIST

## 2020-09-16 RX ORDER — MELOXICAM 15 MG/1
15 TABLET ORAL DAILY
Qty: 30 TAB | Refills: 2 | Status: SHIPPED | OUTPATIENT
Start: 2020-09-16 | End: 2021-01-11

## 2020-09-22 PROBLEM — M72.2 PLANTAR FASCIITIS OF RIGHT FOOT: Status: ACTIVE | Noted: 2020-09-22

## 2020-09-22 NOTE — PROGRESS NOTES
Podiatry History and Physical    Subjective:         Patient is a 62 y.o. female who is being seen as a new pt for right foot pain. Patient states she is previously been seen by other providers (her PCP and emergency room physician) and been diagnosed with plantar fasciitis to the right foot and a possible stress fracture to the same foot. She states that she offloaded the area and her pain has completely gone to the area where the stress fracture was noted but states that she is having 10 out of 10 pain to her plantar heel where the area of the plantar fascial pain is noted. She is the pain is worse with her first up in the morning and after sitting for extended period of time. She states she is not taking any oral anti-inflammatories. She states she has not undergone physical therapy. She denies any recent trauma. She denies any local/systemic signs infection. She denies any other pedal complaints    Past Medical History:   Diagnosis Date    Asthma     History of total abdominal hysterectomy      Past Surgical History:   Procedure Laterality Date    HX TOTAL ABDOMINAL HYSTERECTOMY      IR CHANGE INTERNAL URETERAL STENT SI         Family History   Problem Relation Age of Onset    Breast Cancer Mother     Lung Cancer Mother     Hypertension Father     Diabetes Father       Social History     Tobacco Use    Smoking status: Never Smoker    Smokeless tobacco: Never Used   Substance Use Topics    Alcohol use: Not Currently     No Known Allergies  Prior to Admission medications    Medication Sig Start Date End Date Taking? Authorizing Provider   meloxicam (MOBIC) 15 mg tablet Take 1 Tab by mouth daily. 9/16/20  Yes Phuc Mcclellan DPM   albuterol sulfate (PROVENTIL;VENTOLIN) 2.5 mg/0.5 mL nebu nebulizer solution by Nebulization route once. Yes Provider, Historical   montelukast (Singulair) 10 mg tablet Take 10 mg by mouth daily.    Yes Provider, Historical   hydroCHLOROthiazide (HYDRODIURIL) 25 mg tablet Take 25 mg by mouth daily. Yes Provider, Historical   Omeprazole delayed release (PRILOSEC D/R) 20 mg tablet Take 40 mg by mouth daily. Yes Provider, Historical   terconazole (TERAZOL 7) 0.4 % vaginal cream Insert 1 Applicator into vagina nightly. 8/3/20   Fausto Bee MD       Review of Systems   Constitutional: Negative. Eyes: Negative. Respiratory: Negative. Endocrine: Negative. Genitourinary: Negative. Musculoskeletal: Positive for arthralgias. Allergic/Immunologic: Negative. Neurological: Negative. Hematological: Negative. Psychiatric/Behavioral: Negative. All other systems reviewed and are negative. Objective:     Visit Vitals  /84 (BP 1 Location: Left arm, BP Patient Position: Sitting)   Pulse 61   Temp 96.9 °F (36.1 °C) (Temporal)   Ht 5' 11\" (1.803 m)   Wt 276 lb 9.6 oz (125.5 kg)   SpO2 97%   BMI 38.58 kg/m²       Physical Exam  Vitals signs reviewed. Constitutional:       Appearance: She is morbidly obese. HENT:      Mouth/Throat:      Dentition: Normal dentition. Cardiovascular:      Pulses:           Dorsalis pedis pulses are 2+ on the right side and 2+ on the left side. Posterior tibial pulses are 2+ on the right side and 2+ on the left side. Pulmonary:      Effort: Pulmonary effort is normal.   Musculoskeletal:      Right lower leg: No edema. Left lower leg: No edema. Right foot: Decreased range of motion. No deformity, bunion or Charcot foot. Left foot: Decreased range of motion. No deformity, bunion or Charcot foot. Feet:      Right foot:      Protective Sensation: 10 sites tested. 10 sites sensed. Skin integrity: Skin integrity normal.      Toenail Condition: Right toenails are normal.      Left foot:      Protective Sensation: 10 sites tested. 10 sites sensed. Skin integrity: Skin integrity normal.      Toenail Condition: Left toenails are normal.   Skin:     General: Skin is warm.       Capillary Refill: Capillary refill takes 2 to 3 seconds. Neurological:      Mental Status: She is alert and oriented to person, place, and time. Psychiatric:         Mood and Affect: Mood and affect normal.         Behavior: Behavior is cooperative. Data Review: No results found for this or any previous visit (from the past 24 hour(s)).       Impression:       ICD-10-CM ICD-9-CM    1. Plantar fasciitis of right foot  M72.2 728.71 REFERRAL TO PHYSICAL THERAPY         Recommendation:     Patient seen and evaluated in the office  Discussed and educated patient regarding current medical condition  Instructed patient she need to initiate a stretching regime, use of supportive shoe gear, use of ice bottle therapy and be compliant with all medications  Gave a prescription for meloxicam 15 mg to be taken daily for symptomatic relief  Also gave referral to physical therapy to eval and treat        RTC in 4 weeks

## 2020-09-24 ENCOUNTER — OFFICE VISIT (OUTPATIENT)
Dept: OBGYN CLINIC | Age: 55
End: 2020-09-24
Payer: COMMERCIAL

## 2020-09-24 VITALS
HEIGHT: 71 IN | DIASTOLIC BLOOD PRESSURE: 80 MMHG | SYSTOLIC BLOOD PRESSURE: 118 MMHG | BODY MASS INDEX: 38.64 KG/M2 | WEIGHT: 276 LBS

## 2020-09-24 DIAGNOSIS — N20.0 KIDNEY STONE: Primary | ICD-10-CM

## 2020-09-24 PROCEDURE — 99214 OFFICE O/P EST MOD 30 MIN: CPT | Performed by: OBSTETRICS & GYNECOLOGY

## 2020-09-25 NOTE — PROGRESS NOTES
Logan Wright is a 62 y.o. female, No obstetric history on file., No LMP recorded. Patient has had a hysterectomy. , who presents today for the following:  Chief Complaint   Patient presents with    Results     of u/s. Pt just had a kidney stone removed. No Known Allergies    Current Outpatient Medications   Medication Sig    meloxicam (MOBIC) 15 mg tablet Take 1 Tab by mouth daily.  albuterol sulfate (PROVENTIL;VENTOLIN) 2.5 mg/0.5 mL nebu nebulizer solution by Nebulization route once.  montelukast (Singulair) 10 mg tablet Take 10 mg by mouth daily.  hydroCHLOROthiazide (HYDRODIURIL) 25 mg tablet Take 25 mg by mouth daily.  Omeprazole delayed release (PRILOSEC D/R) 20 mg tablet Take 40 mg by mouth daily.  terconazole (TERAZOL 7) 0.4 % vaginal cream Insert 1 Applicator into vagina nightly. No current facility-administered medications for this visit.         Past Medical History:   Diagnosis Date    Asthma     History of total abdominal hysterectomy        Past Surgical History:   Procedure Laterality Date    HX TOTAL ABDOMINAL HYSTERECTOMY      IR CHANGE INTERNAL URETERAL STENT SI         Family History   Problem Relation Age of Onset    Breast Cancer Mother     Lung Cancer Mother     Hypertension Father     Diabetes Father        Social History     Socioeconomic History    Marital status:      Spouse name: Not on file    Number of children: Not on file    Years of education: Not on file    Highest education level: Not on file   Occupational History    Not on file   Social Needs    Financial resource strain: Not on file    Food insecurity     Worry: Not on file     Inability: Not on file   [employer] needs     Medical: Not on file     Non-medical: Not on file   Tobacco Use    Smoking status: Never Smoker    Smokeless tobacco: Never Used   Substance and Sexual Activity    Alcohol use: Not Currently    Drug use: Never    Sexual activity: Yes     Partners: Male     Birth control/protection: Surgical   Lifestyle    Physical activity     Days per week: Not on file     Minutes per session: Not on file    Stress: Not on file   Relationships    Social connections     Talks on phone: Not on file     Gets together: Not on file     Attends Baptism service: Not on file     Active member of club or organization: Not on file     Attends meetings of clubs or organizations: Not on file     Relationship status: Not on file    Intimate partner violence     Fear of current or ex partner: Not on file     Emotionally abused: Not on file     Physically abused: Not on file     Forced sexual activity: Not on file   Other Topics Concern    Not on file   Social History Narrative    Not on file         HPI Patient presents for follow up. Previously seen secondary to lower abdominal/ pelvic pain. Ultrasound was ordered which reveal no pelvic abnormality. Ct scan revealed enlarge kidney stone. Patient was subsequently seen by urology and stone was removed. Patient currently reports resolution of pain following stone treatment. Review of Systems   Constitutional: Negative. Respiratory: Negative. Cardiovascular: Negative. Gastrointestinal: Negative. Genitourinary: Negative. Musculoskeletal: Negative. Skin: Negative. Neurological: Negative. Endo/Heme/Allergies: Negative. Psychiatric/Behavioral: Negative. All other systems reviewed and are negative. /80 (BP 1 Location: Right arm, BP Patient Position: Sitting)   Ht 5' 11\" (1.803 m)   Wt 276 lb (125.2 kg)   BMI 38.49 kg/m²    OBGyn Exam     PE:  Constitutional: General Appearance: healthy-appearing, well-nourished, well-developed, and well groomed. Psychiatric: Orientation: to time, place, and person. Mood and Affect: normal mood and affect and appropriate and active and alert. Abdomen: Auscultation/Inspection/Palpation: tenderness and mass and non-distended.  Hernia: none palpated. Female Genitalia: Vulva: no masses, atrophy, or lesions. Bladder/Urethra: no urethral discharge or mass and normal meatus and bladder non distended. Vagina no tenderness, erythema, cystocele, rectocele, abnormal vaginal discharge, or vesicle(s) or ulcers. Cervix: absent    Uterus: absent    Adnexa/Parametria: no adnexal tenderness. 1. Kidney stone          Follow-up and Dispositions    · Return if symptoms worsen or fail to improve.

## 2020-09-29 ENCOUNTER — HOSPITAL ENCOUNTER (OUTPATIENT)
Dept: PHYSICAL THERAPY | Age: 55
Discharge: HOME | End: 2020-09-29
Payer: COMMERCIAL

## 2020-09-29 PROCEDURE — 97161 PT EVAL LOW COMPLEX 20 MIN: CPT

## 2020-09-29 PROCEDURE — 97110 THERAPEUTIC EXERCISES: CPT
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
## 2020-10-02 ENCOUNTER — HOSPITAL ENCOUNTER (OUTPATIENT)
Dept: PHYSICAL THERAPY | Age: 55
Discharge: HOME | End: 2020-10-02
Payer: COMMERCIAL

## 2020-10-02 PROCEDURE — 97110 THERAPEUTIC EXERCISES: CPT

## 2020-10-02 PROCEDURE — 97140 MANUAL THERAPY 1/> REGIONS: CPT

## 2020-10-02 NOTE — PROGRESS NOTES
PT DAILY TREATMENT NOTE - [facility] 2-15    Patient Name: Kingsley Santana  Date:10/2/2020  : 1965  [x]  Patient  Verified  Payor: BLUE CROSS / Plan: [employer] [facility] / Product Type: PPO /    In time:015  Out time:235p  Total Treatment Time (min): 40  Total Timed Codes (min): 40  1:1 Treatment Time ( [address] only): 40   Visit #:  Visit count could not be calculated. Make sure you are using a visit which is associated with an episode.     Treatment Area: Right foot pain [M79.671]    SUBJECTIVE  Pain Level (0-10 scale): 0/10  Any medication changes, allergies to medications, adverse drug reactions, diagnosis change, or new procedure performed?: [x] No    [] Yes (see summary sheet for update)  Subjective functional status/changes:   [] No changes reported  Pt reports no pain this afternoon, has only done the runner's stretch on HEP     OBJECTIVE    Modality rationale: decrease inflammation, decrease pain and increase tissue extensibility to improve the patients ability to walk with decreased pain   Min Type Additional Details       [] Estim: []Att   []Unatt    []TENS instruct                  []IFC  []Premod   []NMES                     []Other:  []w/US   []w/ice   []w/heat  Position:  Location:       []  Traction: [] Cervical       []Lumbar                       [] Prone          []Supine                       []Intermittent   []Continuous Lbs:  [] before manual  [] after manual  []w/heat    []  Ultrasound: []Continuous   [] Pulsed                       at: []1MHz   []3MHz Location:  W/cm2:    [] Paraffin         Location:   []w/heat    []  Ice     []  Heat  []  Ice massage Position:  Location:    []  Laser  []  Other: Position:  Location:      []  Vasopneumatic Device Pressure:       [] lo [] med [] hi   Temperature:      [x] Skin assessment post-treatment:  [x]intact []redness- no adverse reaction    []redness  adverse reaction:     30 min Therapeutic Exercise:  [x] See flow sheet :   Rationale: increase ROM, increase strength, improve balance and decrease pain to improve the patients ability to Walk and perform daily activities with decreased pain      10 min Manual Therapy: STM to the area of the calcaneal tubercle and arch of R foot    Rationale: decrease pain and increase tissue extensibility to improve the patients ability to walk with decreased pain              With   [x] TE   [] TA   [] neuro   [] other: Patient Education: [x] Review HEP    [] Progressed/Changed HEP based on:   [] positioning   [] body mechanics   [] transfers   [] heat/ice application    [] other:      Other Objective/Functional Measures: Pt tolerated ex well, several nodules noted medial arch area      Pain Level (0-10 scale) post treatment: 0    ASSESSMENT/Changes in Function:   Pt tolerated treatment well, reported tingling in the bottom of the R foot with  STM   Patient will continue to benefit from skilled PT services to modify and progress therapeutic interventions, address ROM deficits, address strength deficits and analyze and address soft tissue restrictions to attain remaining goals. Progress towards goals / Updated goals:  Patient Goal (s): I want to learn how to manage this  Short Term Goals: To be accomplished in 5 treatments:              1.  Decrease pain when first standing in the morning and after sitting by at least 2-3 points on the VAS  []? Met  []? Not Met []? Partially Met  2. Pt will be independent with HEP  []? Met  []? Not Met []? Partially Met  Long Term Goals: To be accomplished in 12-16 treatments:              1. Pt will be independent with management of R foot pain  []? Met  []? Not Met []? Partially Met  2. Pain </= 2/10 when first getting up in the am and after prolonged sitting  []? Met  []? Not Met []?  Partially Met      []  See Plan of Care  []  See progress note/recertification  []  See Discharge Summary     PLAN  [x]  Upgrade activities as tolerated     [x]  Continue plan of care  [x] Update interventions per flow sheet       []  Discharge due to:_  []  Other:_      Diamond Carson 10/2/2020

## 2021-01-11 RX ORDER — MELOXICAM 15 MG/1
TABLET ORAL
Qty: 30 TAB | Refills: 2 | Status: SHIPPED | OUTPATIENT
Start: 2021-01-11 | End: 2021-04-15

## 2021-04-15 RX ORDER — MELOXICAM 15 MG/1
TABLET ORAL
Qty: 30 TAB | Refills: 2 | Status: SHIPPED | OUTPATIENT
Start: 2021-04-15

## 2021-04-21 ENCOUNTER — APPOINTMENT (OUTPATIENT)
Dept: CT IMAGING | Age: 56
End: 2021-04-21
Attending: NURSE PRACTITIONER
Payer: COMMERCIAL

## 2021-04-21 ENCOUNTER — HOSPITAL ENCOUNTER (EMERGENCY)
Age: 56
Discharge: HOME | End: 2021-04-21
Payer: COMMERCIAL

## 2021-04-21 VITALS
TEMPERATURE: 97.9 F | RESPIRATION RATE: 16 BRPM | SYSTOLIC BLOOD PRESSURE: 135 MMHG | DIASTOLIC BLOOD PRESSURE: 81 MMHG | HEIGHT: 72 IN | WEIGHT: 270 LBS | HEART RATE: 60 BPM | OXYGEN SATURATION: 100 % | BODY MASS INDEX: 36.57 KG/M2

## 2021-04-21 DIAGNOSIS — N13.2 URETERAL STONE WITH HYDRONEPHROSIS: Primary | ICD-10-CM

## 2021-04-21 DIAGNOSIS — N20.0 KIDNEY STONE: ICD-10-CM

## 2021-04-21 LAB
ALBUMIN SERPL-MCNC: 3.4 G/DL (ref 3.5–5)
ALBUMIN/GLOB SERPL: 0.7 {RATIO} (ref 1.1–2.2)
ALP SERPL-CCNC: 82 U/L (ref 45–117)
ALT SERPL-CCNC: 25 U/L (ref 12–78)
ANION GAP SERPL CALC-SCNC: 2 MMOL/L (ref 5–15)
APPEARANCE UR: ABNORMAL
AST SERPL W P-5'-P-CCNC: 17 U/L (ref 15–37)
BACTERIA URNS QL MICRO: NEGATIVE /HPF
BASOPHILS # BLD: 0 K/UL (ref 0–0.1)
BASOPHILS NFR BLD: 0 % (ref 0–1)
BILIRUB SERPL-MCNC: 0.4 MG/DL (ref 0.2–1)
BILIRUB UR QL: NEGATIVE
BUN SERPL-MCNC: 14 MG/DL (ref 6–20)
BUN/CREAT SERPL: 16 (ref 12–20)
CA-I BLD-MCNC: 9.3 MG/DL (ref 8.5–10.1)
CHLORIDE SERPL-SCNC: 108 MMOL/L (ref 97–108)
CO2 SERPL-SCNC: 30 MMOL/L (ref 21–32)
COLOR UR: ABNORMAL
CREAT SERPL-MCNC: 0.89 MG/DL (ref 0.55–1.02)
DIFFERENTIAL METHOD BLD: ABNORMAL
EOSINOPHIL # BLD: 0.1 K/UL (ref 0–0.4)
EOSINOPHIL NFR BLD: 1 % (ref 0–7)
ERYTHROCYTE [DISTWIDTH] IN BLOOD BY AUTOMATED COUNT: 14.6 % (ref 11.5–14.5)
GLOBULIN SER CALC-MCNC: 5 G/DL (ref 2–4)
GLUCOSE SERPL-MCNC: 102 MG/DL (ref 65–100)
GLUCOSE UR STRIP.AUTO-MCNC: NEGATIVE MG/DL
HCT VFR BLD AUTO: 41.5 % (ref 35–47)
HGB BLD-MCNC: 13.3 G/DL (ref 11.5–16)
HGB UR QL STRIP: ABNORMAL
IMM GRANULOCYTES # BLD AUTO: 0 K/UL (ref 0–0.04)
IMM GRANULOCYTES NFR BLD AUTO: 0 % (ref 0–0.5)
KETONES UR QL STRIP.AUTO: NEGATIVE MG/DL
LEUKOCYTE ESTERASE UR QL STRIP.AUTO: ABNORMAL
LYMPHOCYTES # BLD: 1.5 K/UL (ref 0.8–3.5)
LYMPHOCYTES NFR BLD: 31 % (ref 12–49)
MCH RBC QN AUTO: 29.1 PG (ref 26–34)
MCHC RBC AUTO-ENTMCNC: 32 G/DL (ref 30–36.5)
MCV RBC AUTO: 90.8 FL (ref 80–99)
MONOCYTES # BLD: 0.4 K/UL (ref 0–1)
MONOCYTES NFR BLD: 8 % (ref 5–13)
MUCOUS THREADS URNS QL MICRO: ABNORMAL /LPF
NEUTS SEG # BLD: 3 K/UL (ref 1.8–8)
NEUTS SEG NFR BLD: 60 % (ref 32–75)
NITRITE UR QL STRIP.AUTO: NEGATIVE
PH UR STRIP: 7 [PH] (ref 5–8)
PLATELET # BLD AUTO: 263 K/UL (ref 150–400)
PMV BLD AUTO: 10.9 FL (ref 8.9–12.9)
POTASSIUM SERPL-SCNC: 3.8 MMOL/L (ref 3.5–5.1)
PROT SERPL-MCNC: 8.4 G/DL (ref 6.4–8.2)
PROT UR STRIP-MCNC: 100 MG/DL
RBC # BLD AUTO: 4.57 M/UL (ref 3.8–5.2)
RBC #/AREA URNS HPF: >100 /HPF (ref 0–5)
SODIUM SERPL-SCNC: 140 MMOL/L (ref 136–145)
SP GR UR REFRACTOMETRY: 1.02 (ref 1–1.03)
UA: UC IF INDICATED,UAUC: ABNORMAL
UROBILINOGEN UR QL STRIP.AUTO: 2 EU/DL (ref 0.1–1)
WBC # BLD AUTO: 4.9 K/UL (ref 3.6–11)
WBC URNS QL MICRO: ABNORMAL /HPF (ref 0–4)

## 2021-04-21 PROCEDURE — 81001 URINALYSIS AUTO W/SCOPE: CPT

## 2021-04-21 PROCEDURE — 74011250637 HC RX REV CODE- 250/637: Performed by: NURSE PRACTITIONER

## 2021-04-21 PROCEDURE — 36415 COLL VENOUS BLD VENIPUNCTURE: CPT

## 2021-04-21 PROCEDURE — 96374 THER/PROPH/DIAG INJ IV PUSH: CPT

## 2021-04-21 PROCEDURE — 74176 CT ABD & PELVIS W/O CONTRAST: CPT

## 2021-04-21 PROCEDURE — 80053 COMPREHEN METABOLIC PANEL: CPT

## 2021-04-21 PROCEDURE — 74011250636 HC RX REV CODE- 250/636: Performed by: NURSE PRACTITIONER

## 2021-04-21 PROCEDURE — 99284 EMERGENCY DEPT VISIT MOD MDM: CPT

## 2021-04-21 PROCEDURE — 85025 COMPLETE CBC W/AUTO DIFF WBC: CPT

## 2021-04-21 PROCEDURE — 96375 TX/PRO/DX INJ NEW DRUG ADDON: CPT

## 2021-04-21 RX ORDER — HYDROCODONE BITARTRATE AND ACETAMINOPHEN 7.5; 325 MG/1; MG/1
1 TABLET ORAL
Qty: 18 TAB | Refills: 0 | Status: SHIPPED | OUTPATIENT
Start: 2021-04-21 | End: 2021-04-26

## 2021-04-21 RX ORDER — ONDANSETRON 4 MG/1
4 TABLET, ORALLY DISINTEGRATING ORAL
Status: COMPLETED | OUTPATIENT
Start: 2021-04-21 | End: 2021-04-21

## 2021-04-21 RX ORDER — IBUPROFEN 400 MG/1
400 TABLET ORAL
Qty: 20 TAB | Refills: 0 | Status: SHIPPED | OUTPATIENT
Start: 2021-04-21

## 2021-04-21 RX ORDER — ONDANSETRON 2 MG/ML
4 INJECTION INTRAMUSCULAR; INTRAVENOUS
Status: COMPLETED | OUTPATIENT
Start: 2021-04-21 | End: 2021-04-21

## 2021-04-21 RX ORDER — KETOROLAC TROMETHAMINE 30 MG/ML
15 INJECTION, SOLUTION INTRAMUSCULAR; INTRAVENOUS
Status: COMPLETED | OUTPATIENT
Start: 2021-04-21 | End: 2021-04-21

## 2021-04-21 RX ORDER — HYDROCODONE BITARTRATE AND ACETAMINOPHEN 5; 325 MG/1; MG/1
2 TABLET ORAL
Status: COMPLETED | OUTPATIENT
Start: 2021-04-21 | End: 2021-04-21

## 2021-04-21 RX ORDER — PHENAZOPYRIDINE HYDROCHLORIDE 200 MG/1
200 TABLET, FILM COATED ORAL 3 TIMES DAILY
Qty: 6 TAB | Refills: 0 | Status: SHIPPED | OUTPATIENT
Start: 2021-04-21 | End: 2021-04-23

## 2021-04-21 RX ORDER — MORPHINE SULFATE 4 MG/ML
4 INJECTION INTRAVENOUS ONCE
Status: COMPLETED | OUTPATIENT
Start: 2021-04-21 | End: 2021-04-21

## 2021-04-21 RX ORDER — TAMSULOSIN HYDROCHLORIDE 0.4 MG/1
0.4 CAPSULE ORAL DAILY
Qty: 15 CAP | Refills: 0 | Status: SHIPPED | OUTPATIENT
Start: 2021-04-21 | End: 2021-05-06

## 2021-04-21 RX ORDER — ONDANSETRON 4 MG/1
4 TABLET, ORALLY DISINTEGRATING ORAL
Qty: 10 TAB | Refills: 1 | Status: SHIPPED | OUTPATIENT
Start: 2021-04-21

## 2021-04-21 RX ADMIN — SODIUM CHLORIDE 1000 ML: 9 INJECTION, SOLUTION INTRAVENOUS at 11:05

## 2021-04-21 RX ADMIN — KETOROLAC TROMETHAMINE 15 MG: 30 INJECTION, SOLUTION INTRAMUSCULAR; INTRAVENOUS at 11:05

## 2021-04-21 RX ADMIN — ONDANSETRON 4 MG: 2 INJECTION INTRAMUSCULAR; INTRAVENOUS at 11:05

## 2021-04-21 RX ADMIN — ONDANSETRON 4 MG: 4 TABLET, ORALLY DISINTEGRATING ORAL at 13:13

## 2021-04-21 RX ADMIN — MORPHINE SULFATE 4 MG: 4 INJECTION, SOLUTION INTRAMUSCULAR; INTRAVENOUS at 11:05

## 2021-04-21 RX ADMIN — HYDROCODONE BITARTRATE AND ACETAMINOPHEN 2 TABLET: 5; 325 TABLET ORAL at 13:13

## 2021-04-21 NOTE — ED PROVIDER NOTES
EMERGENCY DEPARTMENT HISTORY AND PHYSICAL EXAM      Date: 4/21/2021  Patient Name: Rosina Turner    History of Presenting Illness     Chief Complaint   Patient presents with    Flank Pain       History Provided By: Patient    HPI: Rosina Turner, 60 y.o. female with a past medical history significant hypertension, hyperlipidemia and obesity presents to the ED with cc of flank pain. Patient has a history of kidney stones. Patient complains of right flank pain. Patient states that similar to previous episodes of kidney stones. Moderate severity, no known exacerbating or relieving factors, no other associated signs and symptoms    There are no other complaints, changes, or physical findings at this time. PCP: Freddrick Oppenheim, MD    No current facility-administered medications on file prior to encounter. Current Outpatient Medications on File Prior to Encounter   Medication Sig Dispense Refill    meloxicam (MOBIC) 15 mg tablet TAKE 1 TABLET BY MOUTH EVERY DAY 30 Tab 2    albuterol sulfate (PROVENTIL;VENTOLIN) 2.5 mg/0.5 mL nebu nebulizer solution by Nebulization route once.  montelukast (Singulair) 10 mg tablet Take 10 mg by mouth daily.  hydroCHLOROthiazide (HYDRODIURIL) 25 mg tablet Take 25 mg by mouth daily.  Omeprazole delayed release (PRILOSEC D/R) 20 mg tablet Take 40 mg by mouth daily.  terconazole (TERAZOL 7) 0.4 % vaginal cream Insert 1 Applicator into vagina nightly.  45 g 0       Past History     Past Medical History:  Past Medical History:   Diagnosis Date    Asthma     History of total abdominal hysterectomy        Past Surgical History:  Past Surgical History:   Procedure Laterality Date    HX TOTAL ABDOMINAL HYSTERECTOMY      IR CHANGE INTERNAL URETERAL STENT SI         Family History:  Family History   Problem Relation Age of Onset    Breast Cancer Mother     Lung Cancer Mother     Hypertension Father     Diabetes Father        Social History:  Social History     Tobacco Use    Smoking status: Never Smoker    Smokeless tobacco: Never Used   Substance Use Topics    Alcohol use: Not Currently    Drug use: Never       Allergies:  No Known Allergies      Review of Systems     Review of Systems   Constitutional: Positive for fatigue. Negative for chills and fever. HENT: Negative for congestion, sinus pressure and trouble swallowing. Eyes: Negative for photophobia and pain. Respiratory: Negative for cough and shortness of breath. Cardiovascular: Negative for chest pain and leg swelling. Gastrointestinal: Positive for abdominal pain, nausea and vomiting. Endocrine: Negative for polydipsia, polyphagia and polyuria. Genitourinary: Positive for flank pain. Negative for decreased urine volume, difficulty urinating, dysuria, hematuria and urgency. Musculoskeletal: Negative for back pain, gait problem, myalgias and neck pain. Skin: Negative for pallor and rash. Allergic/Immunologic: Negative for environmental allergies and food allergies. Neurological: Negative for dizziness, facial asymmetry, speech difficulty, numbness and headaches. Hematological: Negative for adenopathy. Does not bruise/bleed easily. Psychiatric/Behavioral: Negative for agitation, self-injury and suicidal ideas. The patient is not nervous/anxious. Physical Exam     Physical Exam  Vitals signs and nursing note reviewed. Constitutional:       Appearance: Normal appearance. HENT:      Head: Atraumatic. Right Ear: Tympanic membrane and external ear normal.      Left Ear: Tympanic membrane and external ear normal.      Nose: Nose normal.      Mouth/Throat:      Mouth: Mucous membranes are moist.   Eyes:      Extraocular Movements: Extraocular movements intact. Pupils: Pupils are equal, round, and reactive to light. Neck:      Musculoskeletal: Normal range of motion and neck supple. Cardiovascular:      Rate and Rhythm: Normal rate and regular rhythm. Pulses: Normal pulses. Heart sounds: Normal heart sounds. Pulmonary:      Breath sounds: Normal breath sounds. Abdominal:      General: Abdomen is flat. Palpations: Abdomen is soft. Musculoskeletal: Normal range of motion. Skin:     General: Skin is warm and dry. Capillary Refill: Capillary refill takes less than 2 seconds. Neurological:      General: No focal deficit present. Mental Status: She is alert and oriented to person, place, and time. Mental status is at baseline. Psychiatric:         Mood and Affect: Mood normal.         Behavior: Behavior normal.         Lab and Diagnostic Study Results     Labs -     No results found for this or any previous visit (from the past 12 hour(s)). Radiologic Studies -   @lastxrresult@  CT Results  (Last 48 hours)               04/21/21 1159  CT ABD PELV WO CONT Final result    Impression:  Nephrolithiasis. Moderate grade hydronephrosis right kidney noting 4 mm stone right UVJ. Cholelithiasis. Narrative:  CT abdomen and pelvis without IV contrast       Axial images are reviewed along with reformatted sagittal/coronal images. No IV   contrast administered. Dose reduction: All CT scans at this facility are performed using dose reduction   optimization techniques as appropriate to a performed exam including the   following-   automated exposure control, adjustments of mA and/or Kv according to patient   size, or use of iterative reconstructive technique. Lung bases are clear. Nonenhanced images demonstrate unremarkable appearance for the liver. Gallstone   measuring approximately 2.5 cm gallbladder lumen. Pancreas, bilateral adrenal   glands, and spleen appear unremarkable. Left kidney with 2 mm nonobstructing lower calyceal stone. No hydronephrosis. Moderate grade hydronephrosis right kidney. Few 5 mm or less nonobstructing   calyceal stones noted mid and lower calyces right kidney.  Right ureter is   distended to the level of the ureteral vesicle junction where there is a 4 mm   calcified stone. No bowel obstruction. No CT evidence for appendicitis or diverticulitis. No   ascites. No calcified bladder stone. Normal caliber abdominal aorta. Small fat-containing periumbilical hernia. CXR Results  (Last 48 hours)    None            Medical Decision Making   - I am the first provider for this patient. - I reviewed the vital signs, available nursing notes, past medical history, past surgical history, family history and social history. - Initial assessment performed. The patients presenting problems have been discussed, and they are in agreement with the care plan formulated and outlined with them. I have encouraged them to ask questions as they arise throughout their visit. Vital Signs-Reviewed the patient's vital signs. No data found. Records Reviewed:           ED Course:          Provider Notes (Medical Decision Making):   Patient presents with flank pain. Differential diagnosis include kidney stone, pyelonephritis, obstruction, constipation, diverticulitis.  + renal stone. Pain well controlled after ED medication. No intractable vomiting. No evidence of acute complication including obstruction/sepsis/mmunocompromising status. Appropriate candidate for outpatient management w/ urology referral and PCP f/u. MDM       Procedures   Medical Decision Makingedical Decision Making  Performed by: Nash Kohler NP  PROCEDURES:  Procedures       Disposition   Disposition: DC-The patient was given verbal abdominal pain warning signs and instructions    Discharged    DISCHARGE PLAN:  1.    Current Discharge Medication List      CONTINUE these medications which have NOT CHANGED    Details   meloxicam (MOBIC) 15 mg tablet TAKE 1 TABLET BY MOUTH EVERY DAY  Qty: 30 Tab, Refills: 2      albuterol sulfate (PROVENTIL;VENTOLIN) 2.5 mg/0.5 mL nebu nebulizer solution by Nebulization route once. montelukast (Singulair) 10 mg tablet Take 10 mg by mouth daily. hydroCHLOROthiazide (HYDRODIURIL) 25 mg tablet Take 25 mg by mouth daily. Omeprazole delayed release (PRILOSEC D/R) 20 mg tablet Take 40 mg by mouth daily. terconazole (TERAZOL 7) 0.4 % vaginal cream Insert 1 Applicator into vagina nightly. Qty: 45 g, Refills: 0    Associated Diagnoses: Acute vaginitis           2. Follow-up Information     Follow up With Specialties Details Why Contact Info    Stacey Lyn MD Rheumatology   [address] [facility] [address]  [address] [place] [address] Urology   [phone]  Kidney Bishopy Gerardo Shields  [address] [facility] Urology Schedule an appointment as soon as possible for a visit  As needed [address] [place] [address]  [phone]          3. Return to ED if worse   4. Discharge Medication List as of 4/21/2021  1:02 PM      START taking these medications    Details   HYDROcodone-acetaminophen (Norco) 7.5-325 mg per tablet Take 1 Tab by mouth every six (6) hours as needed for Pain for up to 5 days. Max Daily Amount: 4 Tabs., Normal, Disp-18 Tab, R-0      phenazopyridine (Pyridium) 200 mg tablet Take 1 Tab by mouth three (3) times daily for 2 days. , Normal, Disp-6 Tab, R-0      tamsulosin (Flomax) 0.4 mg capsule Take 1 Cap by mouth daily for 15 days. , Normal, Disp-15 Cap, R-0      ibuprofen (MOTRIN) 400 mg tablet Take 1 Tab by mouth every six (6) hours as needed for Pain., Normal, Disp-20 Tab, R-0      ondansetron (Zofran ODT) 4 mg disintegrating tablet Take 1 Tab by mouth every eight (8) hours as needed for Nausea or Vomiting., Normal, Disp-10 Tab, R-1         CONTINUE these medications which have NOT CHANGED    Details   meloxicam (MOBIC) 15 mg tablet TAKE 1 TABLET BY MOUTH EVERY DAY, Normal, Disp-30 Tab, R-2      albuterol sulfate (PROVENTIL;VENTOLIN) 2.5 mg/0.5 mL nebu nebulizer solution by Nebulization route once., Historical Med      montelukast (Singulair) 10 mg tablet Take 10 mg by mouth daily. , Historical Med      hydroCHLOROthiazide (HYDRODIURIL) 25 mg tablet Take 25 mg by mouth daily. , Historical Med      Omeprazole delayed release (PRILOSEC D/R) 20 mg tablet Take 40 mg by mouth daily. , Historical Med      terconazole (TERAZOL 7) 0.4 % vaginal cream Insert 1 Applicator into vagina nightly., Normal, Disp-45 g,R-0               Diagnosis     Clinical Impression:   1. Ureteral stone with hydronephrosis    2. Kidney stone        Attestations:    Zana Gomez, NP    Please note that this dictation was completed with [employer], the [employer] voice recognition software. Quite often unanticipated grammatical, syntax, homophones, and other interpretive errors are inadvertently transcribed by the computer software. Please disregard these errors. Please excuse any errors that have escaped final proofreading. Thank you.

## 2022-01-20 ENCOUNTER — HOSPITAL ENCOUNTER (OUTPATIENT)
Dept: CT IMAGING | Age: 57
Discharge: HOME | End: 2022-01-20
Payer: COMMERCIAL

## 2022-01-20 ENCOUNTER — TRANSCRIBE ORDER (OUTPATIENT)
Dept: SCHEDULING | Age: 57
End: 2022-01-20

## 2022-01-20 DIAGNOSIS — N20.0 URIC ACID NEPHROLITHIASIS: ICD-10-CM

## 2022-01-20 DIAGNOSIS — N20.0 URIC ACID NEPHROLITHIASIS: Primary | ICD-10-CM

## 2022-01-20 PROCEDURE — 74150 CT ABDOMEN W/O CONTRAST: CPT

## 2022-01-21 ENCOUNTER — TRANSCRIBE ORDER (OUTPATIENT)
Dept: SCHEDULING | Age: 57
End: 2022-01-21

## 2022-01-21 DIAGNOSIS — N20.0 RENAL CALCULUS: Primary | ICD-10-CM

## 2022-03-18 PROBLEM — M72.2 PLANTAR FASCIITIS OF RIGHT FOOT: Status: ACTIVE | Noted: 2020-09-22

## 2022-04-08 ENCOUNTER — TRANSCRIBE ORDER (OUTPATIENT)
Dept: SCHEDULING | Age: 57
End: 2022-04-08

## 2022-04-08 DIAGNOSIS — M21.061 ACQUIRED GENU VALGUM OF BOTH KNEES: ICD-10-CM

## 2022-04-08 DIAGNOSIS — M17.0 PRIMARY OSTEOARTHRITIS OF BOTH KNEES: ICD-10-CM

## 2022-04-08 DIAGNOSIS — S83.242A ACUTE MEDIAL MENISCUS TEAR OF LEFT KNEE: ICD-10-CM

## 2022-04-08 DIAGNOSIS — M25.562 BILATERAL KNEE PAIN: Primary | ICD-10-CM

## 2022-04-08 DIAGNOSIS — M25.561 BILATERAL KNEE PAIN: Primary | ICD-10-CM

## 2022-04-08 DIAGNOSIS — M21.062 ACQUIRED GENU VALGUM OF BOTH KNEES: ICD-10-CM

## 2022-04-08 DIAGNOSIS — E66.01 MORBID OBESITY (HCC): ICD-10-CM

## 2022-04-18 ENCOUNTER — HOSPITAL ENCOUNTER (OUTPATIENT)
Dept: MRI IMAGING | Age: 57
Discharge: HOME | End: 2022-04-18
Attending: ORTHOPAEDIC SURGERY
Payer: COMMERCIAL

## 2022-04-18 DIAGNOSIS — M21.061 ACQUIRED GENU VALGUM OF BOTH KNEES: ICD-10-CM

## 2022-04-18 DIAGNOSIS — M21.062 ACQUIRED GENU VALGUM OF BOTH KNEES: ICD-10-CM

## 2022-04-18 DIAGNOSIS — E66.01 MORBID OBESITY (HCC): ICD-10-CM

## 2022-04-18 DIAGNOSIS — M25.562 BILATERAL KNEE PAIN: ICD-10-CM

## 2022-04-18 DIAGNOSIS — S83.242A ACUTE MEDIAL MENISCUS TEAR OF LEFT KNEE: ICD-10-CM

## 2022-04-18 DIAGNOSIS — M17.0 PRIMARY OSTEOARTHRITIS OF BOTH KNEES: ICD-10-CM

## 2022-04-18 DIAGNOSIS — M25.561 BILATERAL KNEE PAIN: ICD-10-CM

## 2022-04-18 PROCEDURE — 73721 MRI JNT OF LWR EXTRE W/O DYE: CPT

## 2022-07-23 ENCOUNTER — APPOINTMENT (OUTPATIENT)
Dept: GENERAL RADIOLOGY | Age: 57
End: 2022-07-23
Attending: STUDENT IN AN ORGANIZED HEALTH CARE EDUCATION/TRAINING PROGRAM
Payer: COMMERCIAL

## 2022-07-23 ENCOUNTER — HOSPITAL ENCOUNTER (EMERGENCY)
Age: 57
Discharge: HOME | End: 2022-07-24
Attending: STUDENT IN AN ORGANIZED HEALTH CARE EDUCATION/TRAINING PROGRAM
Payer: COMMERCIAL

## 2022-07-23 ENCOUNTER — APPOINTMENT (OUTPATIENT)
Dept: CT IMAGING | Age: 57
End: 2022-07-23
Attending: STUDENT IN AN ORGANIZED HEALTH CARE EDUCATION/TRAINING PROGRAM
Payer: COMMERCIAL

## 2022-07-23 VITALS
WEIGHT: 260 LBS | DIASTOLIC BLOOD PRESSURE: 94 MMHG | TEMPERATURE: 98.7 F | OXYGEN SATURATION: 99 % | HEIGHT: 71 IN | RESPIRATION RATE: 16 BRPM | HEART RATE: 89 BPM | BODY MASS INDEX: 36.4 KG/M2 | SYSTOLIC BLOOD PRESSURE: 159 MMHG

## 2022-07-23 DIAGNOSIS — S92.535A CLOSED NONDISPLACED FRACTURE OF DISTAL PHALANX OF LESSER TOE OF LEFT FOOT, INITIAL ENCOUNTER: Primary | ICD-10-CM

## 2022-07-23 PROCEDURE — 73590 X-RAY EXAM OF LOWER LEG: CPT

## 2022-07-23 PROCEDURE — 73630 X-RAY EXAM OF FOOT: CPT

## 2022-07-23 PROCEDURE — 99284 EMERGENCY DEPT VISIT MOD MDM: CPT

## 2022-07-23 PROCEDURE — 74011250637 HC RX REV CODE- 250/637: Performed by: STUDENT IN AN ORGANIZED HEALTH CARE EDUCATION/TRAINING PROGRAM

## 2022-07-23 PROCEDURE — 72131 CT LUMBAR SPINE W/O DYE: CPT

## 2022-07-23 PROCEDURE — 71045 X-RAY EXAM CHEST 1 VIEW: CPT

## 2022-07-23 RX ORDER — METHOCARBAMOL 750 MG/1
1500 TABLET, FILM COATED ORAL ONCE
Status: COMPLETED | OUTPATIENT
Start: 2022-07-23 | End: 2022-07-23

## 2022-07-23 RX ORDER — IBUPROFEN 600 MG/1
600 TABLET ORAL
Status: COMPLETED | OUTPATIENT
Start: 2022-07-23 | End: 2022-07-23

## 2022-07-23 RX ADMIN — METHOCARBAMOL 1500 MG: 750 TABLET ORAL at 23:15

## 2022-07-23 RX ADMIN — IBUPROFEN 600 MG: 600 TABLET, FILM COATED ORAL at 23:15

## 2022-07-24 RX ORDER — ACETAMINOPHEN 325 MG/1
650 TABLET ORAL
Qty: 20 TABLET | Refills: 0 | Status: SHIPPED | OUTPATIENT
Start: 2022-07-24

## 2022-07-24 RX ORDER — METHOCARBAMOL 750 MG/1
750 TABLET, FILM COATED ORAL
Qty: 20 TABLET | Refills: 0 | Status: SHIPPED | OUTPATIENT
Start: 2022-07-24

## 2022-07-24 RX ORDER — IBUPROFEN 600 MG/1
600 TABLET ORAL
Qty: 20 TABLET | Refills: 0 | Status: SHIPPED | OUTPATIENT
Start: 2022-07-24
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
## 2022-07-24 NOTE — DISCHARGE INSTRUCTIONS
Thank you! Thank you for allowing me to care for you in the emergency department. I sincerely hope that you are satisfied with your visit today. It is my goal to provide you with excellent care. Below you will find a list of your labs and imaging from your visit today if applicable. Should you have any questions regarding these results please do not hesitate to call the emergency department. Please review [employer] for a more detailed result list since the below list may not be comprehensive. Instructions on how to sign up to [employer] should be provided in this packet. Labs -   No results found for this or any previous visit (from the past 12 hour(s)). Radiologic Studies -   CT SPINE LUMB WO CONT   Final Result   No acute fracture. Grade 1 anterolisthesis at L5-S1 secondary to degenerative   disc disease and bilateral facet arthropathy. XR FOOT RT MIN 3 V   Final Result   Nondisplaced fifth proximal phalanx fracture. XR TIB/FIB RT   Final Result   No acute abnormality. XR CHEST PORT   Final Result   No evidence of acute cardiopulmonary process. CT Results  (Last 48 hours)                 07/23/22 4509  CT SPINE LUMB WO CONT Final result    Impression:  No acute fracture. Grade 1 anterolisthesis at L5-S1 secondary to degenerative   disc disease and bilateral facet arthropathy. Narrative:  EXAM:  CT LUMBAR SPINE WITHOUT  CONTRAST       INDICATION: trauma. COMPARISON: None. CONTRAST:  None. TECHNIQUE: Multislice helical CT of the lumbar spine was performed without   intravenous contrast administration. Coronal and sagittal reformats were   generated. CT dose reduction was achieved through use of a standardized   protocol tailored for this examination and automatic exposure control for dose   modulation.         FINDINGS:       There is grade 1 anterolisthesis at L5-S1 by approximately 5 mm secondary to   degenerative disc disease and facet arthropathy. There is no fracture or   compression deformity. No significant spinal canal or neural foraminal stenosis   is seen at any level. The paravertebral soft tissues are within normal limits. CXR Results  (Last 48 hours)                 07/23/22 2255  XR CHEST PORT Final result    Impression:  No evidence of acute cardiopulmonary process. Narrative:  INDICATION: Pain       COMPARISON: 9/22/2008       FINDINGS: AP portable imaging of the chest performed at 10:44 PM demonstrates a   stable cardiomediastinal silhouette. The lungs are clear bilaterally. No   significant osseous abnormalities are seen. If you feel that you have not received excellent quality care or timely care, please ask to speak to the nurse manager. Please choose us in the future for your continued health care needs. ------------------------------------------------------------------------------------------------------------  The exam and treatment you received in the Emergency Department were for an urgent problem and are not intended as complete care. It is important that you follow-up with a doctor, nurse practitioner, or physician assistant to:  (1) confirm your diagnosis,  (2) re-evaluation of changes in your illness and treatment, and  (3) for ongoing care. If your symptoms become worse or you do not improve as expected and you are unable to reach your usual health care provider, you should return to the Emergency Department. We are available 24 hours a day. Please take your discharge instructions with you when you go to your follow-up appointment. If a prescription has been provided, please have it filled as soon as possible to prevent a delay in treatment. Read the entire medication instruction sheet provided to you by the pharmacy.  If you have any questions or reservations about taking the medication due to side effects or interactions with other medications, please call your primary care physician or contact the ER to speak with the charge nurse. Make an appointment with your family doctor or the physician you were referred to for follow-up of this visit as instructed on your discharge paperwork, as this is a mandatory follow-up. Return to the ER if you are unable to be seen or if you are unable to be seen in a timely manner. If you have any problem arranging the follow-up visit, contact the Emergency Department immediately.
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
## 2023-02-07 ENCOUNTER — OFFICE VISIT (OUTPATIENT)
Dept: PODIATRY | Age: 58
End: 2023-02-07
Payer: COMMERCIAL

## 2023-02-07 VITALS
DIASTOLIC BLOOD PRESSURE: 91 MMHG | BODY MASS INDEX: 36.61 KG/M2 | WEIGHT: 261.5 LBS | SYSTOLIC BLOOD PRESSURE: 149 MMHG | HEART RATE: 94 BPM | TEMPERATURE: 97.5 F | RESPIRATION RATE: 16 BRPM | HEIGHT: 71 IN

## 2023-02-07 DIAGNOSIS — M72.2 PLANTAR FASCIITIS OF RIGHT FOOT: Primary | ICD-10-CM

## 2023-02-07 PROCEDURE — 99213 OFFICE O/P EST LOW 20 MIN: CPT | Performed by: PODIATRIST

## 2023-02-07 RX ORDER — NAPROXEN 500 MG/1
500 TABLET ORAL 2 TIMES DAILY WITH MEALS
COMMUNITY
Start: 2022-11-13

## 2023-02-07 RX ORDER — ACETAMINOPHEN 500 MG
2000 TABLET ORAL DAILY
COMMUNITY
Start: 2023-01-06

## 2023-02-07 RX ORDER — DICLOFENAC SODIUM 10 MG/G
GEL TOPICAL
COMMUNITY
Start: 2022-04-08

## 2023-02-07 RX ORDER — FLUTICASONE PROPIONATE AND SALMETEROL 250; 50 UG/1; UG/1
POWDER RESPIRATORY (INHALATION)
COMMUNITY
Start: 2023-01-22

## 2023-02-07 RX ORDER — CYCLOBENZAPRINE HCL 10 MG
TABLET ORAL
COMMUNITY
Start: 2022-12-08

## 2023-02-07 RX ORDER — FLUTICASONE PROPIONATE AND SALMETEROL 250; 50 UG/1; UG/1
POWDER RESPIRATORY (INHALATION)
COMMUNITY

## 2023-02-07 NOTE — PROGRESS NOTES
1. Have you been to the ER, urgent care clinic since your last visit? Hospitalized since your last visit? No    2. Have you seen or consulted any other health care providers outside of the [address] [place] [address] since your last visit? Include any pap smears or colon screening.  No    Visit Vitals  BP (!) 149/91 (BP 1 Location: Left upper arm, BP Patient Position: Sitting, BP Cuff Size: Adult)   Pulse 94   Temp 97.5 °F (36.4 °C)   Resp 16   Ht 5' 11\" (1.803 m)   Wt 261 lb 8 oz (118.6 kg)   BMI 36.47 kg/m²       Chief Complaint   Patient presents with    Follow-up     Pain in both feet

## 2023-03-06 NOTE — PROGRESS NOTES
[place] [facility]     Patient Name: Danny Chauhan    : 1965    Visit Date: 2023    Office Visit Note    Subjective:         Patient is a 59 y.o. female who is being seen in office follow-up visit for pain to bilateral feet. Patient states that she is mostly having pain to the right heel. Patient has been suffering from this for a long time. Past Medical History:   Diagnosis Date    Asthma     History of total abdominal hysterectomy      Past Surgical History:   Procedure Laterality Date    HX TOTAL ABDOMINAL HYSTERECTOMY      IR CHANGE INTERNAL URETERAL STENT SI         Family History   Problem Relation Age of Onset    Breast Cancer Mother     Lung Cancer Mother     Hypertension Father     Diabetes Father       Social History     Tobacco Use    Smoking status: Never    Smokeless tobacco: Never   Substance Use Topics    Alcohol use: Not Currently     No Known Allergies  Prior to Admission medications    Medication Sig Start Date End Date Taking? Authorizing Provider   cholecalciferol (VITAMIN D3) (2,000 UNITS /50 MCG) cap capsule Take 2,000 Units by mouth daily. 23  Yes Provider, Historical   cyclobenzaprine (FLEXERIL) 10 mg tablet TAKE 1 TABLET BY MOUTH AT BEDTIME AS NEEDED 22  Yes Provider, Historical   diclofenac (VOLTAREN) 1 % gel 4 GRAMS 4 TIMES A DAY AS NEEDED 22  Yes Provider, Historical   fluticasone propion-salmeteroL (ADVAIR/WIXELA) 250-50 mcg/dose diskus inhaler Advair Diskus 250 mcg-50 mcg/dose powder for inhalation   Yes Provider, Historical   Wixela Inhub 250-50 mcg/dose diskus inhaler INHALE ONE PUFF BY MOUTH TWICE DAILY 23  Yes Provider, Historical   naproxen (NAPROSYN) 500 mg tablet Take 500 mg by mouth two (2) times daily (with meals).  22  Yes Provider, Historical   ibuprofen (MOTRIN) 400 mg tablet Take 1 Tab by mouth every six (6) hours as needed for Pain. 21  Yes Kelly Mcintyre NP   albuterol sulfate (PROVENTIL;VENTOLIN) 2.5 mg/0.5 mL nebu nebulizer solution by Nebulization route once. Yes Provider, Historical   montelukast (SINGULAIR) 10 mg tablet Take 10 mg by mouth daily. Yes Provider, Historical   hydroCHLOROthiazide (HYDRODIURIL) 25 mg tablet Take 25 mg by mouth daily. Yes Provider, Historical   Omeprazole delayed release (PRILOSEC D/R) 20 mg tablet Take 40 mg by mouth daily. Yes Provider, Historical   acetaminophen (TYLENOL) 325 mg tablet Take 2 Tablets by mouth every six (6) hours as needed for Pain. Patient not taking: Reported on 2/7/2023 7/24/22   Ranjan Mccall MD   methocarbamoL (Robaxin-750) 750 mg tablet Take 1 Tablet by mouth three (3) times daily as needed for Muscle Spasm(s). Patient not taking: Reported on 2/7/2023 7/24/22   Ranjan Mccall MD   ibuprofen (MOTRIN) 600 mg tablet Take 1 Tablet by mouth three (3) times daily as needed for Pain. 7/24/22   Ranjan Mccall MD   ondansetron (Zofran ODT) 4 mg disintegrating tablet Take 1 Tab by mouth every eight (8) hours as needed for Nausea or Vomiting. Patient not taking: Reported on 2/7/2023 4/21/21   Dilcia Ellis NP   meloxicam (MOBIC) 15 mg tablet TAKE 1 TABLET BY MOUTH EVERY DAY  Patient not taking: Reported on 2/7/2023 4/15/21   Shari Mcclellan DPM   terconazole (TERAZOL 7) 0.4 % vaginal cream Insert 1 Applicator into vagina nightly. Patient not taking: Reported on 2/7/2023 8/3/20   Micaela Eduardo MD       Review of Systems   Constitutional:  Negative for chills, diaphoresis, fever and malaise/fatigue. Respiratory:  Negative for cough, hemoptysis, sputum production, shortness of breath and wheezing. No cyanosis   Cardiovascular:  Negative for chest pain, palpitations, claudication and leg swelling. Gastrointestinal:  Negative for abdominal pain, constipation, diarrhea, heartburn, nausea and vomiting. Genitourinary:  Negative for frequency. Musculoskeletal:  Positive for joint pain. Negative for back pain, myalgias and neck pain.    Skin: Negative for itching and rash. Neurological:  Negative for tingling and headaches. Alert and oriented x 4. Endo/Heme/Allergies:  Negative for polydipsia. Psychiatric/Behavioral:  Negative for depression and memory loss. The patient is not nervous/anxious. Objective:     Visit Vitals  BP (!) 149/91 (BP 1 Location: Left upper arm, BP Patient Position: Sitting, BP Cuff Size: Adult)   Pulse 94   Temp 97.5 °F (36.4 °C)   Resp 16   Ht 5' 11\" (1.803 m)   Wt 261 lb 8 oz (118.6 kg)   BMI 36.47 kg/m²       GEN: Pt is AAOx4 and in NAD. No dressing noted to B/L LE. No family noted at [facility]  DERM: No wounds noted to B/L LE. No dry skin noted to B/L LE. No proximal lymphatic streaking noted to B/L LE. NCSOI noted to B/L LE  VASC: Pedal pulses (DP/PT) palpable to B/L LE. CFT<3sec to all digits of B/L LE. Pedal hair growth noted to the level of the digits for B/L LE. Skin temp is warm to warm from proximal to distal for B/L LE. Neg homans/morelia signs to B/L LE. No varicosities or telangectasias noted to B/L LE.  NEURO: Protective and epicritic sensations grossly intact to B/L LE  MSK: Pain on palpation to bilateral heels no gross deformities. Good muscle tone and bulk noted to B/L LE.  PSYCH: Cooperative with normal mood and affect     Data Review: No results found for this or any previous visit (from the past 24 hour(s)). Assessment:   Diagnoses and all orders for this visit:    1. Plantar fasciitis of right foot       Plan:     Patient seen and evaluated in the office. Educated patient on her condition. Recommended patient on new shoe gear, orthotics, oral anti-inflammatory medication, and home stretching exercises. If pain persists I would recommend patient consider. Follow-up and Dispositions    Return in about 4 weeks (around 3/7/2023).           Melissa Jean DPM, CWSP, [address] , [facility]7 [facility]  O: [phone]

## 2023-03-07 ENCOUNTER — OFFICE VISIT (OUTPATIENT)
Dept: PODIATRY | Age: 58
End: 2023-03-07

## 2023-03-07 VITALS
HEIGHT: 71 IN | HEART RATE: 72 BPM | WEIGHT: 261.38 LBS | BODY MASS INDEX: 36.59 KG/M2 | TEMPERATURE: 97.5 F | DIASTOLIC BLOOD PRESSURE: 83 MMHG | SYSTOLIC BLOOD PRESSURE: 138 MMHG | RESPIRATION RATE: 16 BRPM

## 2023-03-07 DIAGNOSIS — M72.2 PLANTAR FASCIITIS: ICD-10-CM

## 2023-03-07 DIAGNOSIS — M76.72 PERONEAL TENDONITIS OF LEFT LOWER EXTREMITY: Primary | ICD-10-CM

## 2023-03-07 DIAGNOSIS — L60.2 HYPERTROPHY OF NAIL: ICD-10-CM

## 2023-03-07 DIAGNOSIS — M76.71 PERONEAL TENDONITIS OF RIGHT LOWER EXTREMITY: ICD-10-CM

## 2023-03-07 RX ORDER — METHYLPREDNISOLONE 4 MG/1
TABLET ORAL
Qty: 1 DOSE PACK | Refills: 0 | Status: SHIPPED | OUTPATIENT
Start: 2023-03-07

## 2023-03-07 NOTE — PROGRESS NOTES
[place] [facility]     Patient Name: Artem Fitzpatrick    : 1965    Visit Date: 3/7/2023    Office Visit Note    Subjective:         Patient is a 59 y.o. female who is being seen in office follow-up visit for pain to bilateral feet. Patient states that her pain has mildly improved with injection. Patient states that she still has pain. Patient is also complaining about her discolored thickened nails. Past Medical History:   Diagnosis Date    Asthma     History of total abdominal hysterectomy      Past Surgical History:   Procedure Laterality Date    HX TOTAL ABDOMINAL HYSTERECTOMY      IR CHANGE INTERNAL URETERAL STENT SI         Family History   Problem Relation Age of Onset    Breast Cancer Mother     Lung Cancer Mother     Hypertension Father     Diabetes Father       Social History     Tobacco Use    Smoking status: Never    Smokeless tobacco: Never   Substance Use Topics    Alcohol use: Not Currently     No Known Allergies  Prior to Admission medications    Medication Sig Start Date End Date Taking? Authorizing Provider   methylPREDNISolone (MEDROL DOSEPACK) 4 mg tablet As directed 3/7/23  Yes Coleman Bess DPM   cholecalciferol (VITAMIN D3) (2,000 UNITS /50 MCG) cap capsule Take 2,000 Units by mouth daily. 23   Provider, Historical   cyclobenzaprine (FLEXERIL) 10 mg tablet TAKE 1 TABLET BY MOUTH AT BEDTIME AS NEEDED 22   Provider, Historical   diclofenac (VOLTAREN) 1 % gel 4 GRAMS 4 TIMES A DAY AS NEEDED 22   Provider, Historical   fluticasone propion-salmeteroL (ADVAIR/WIXELA) 250-50 mcg/dose diskus inhaler Advair Diskus 250 mcg-50 mcg/dose powder for inhalation    Provider, Historical   Wixela Inhub 250-50 mcg/dose diskus inhaler INHALE ONE PUFF BY MOUTH TWICE DAILY 23   Provider, Historical   naproxen (NAPROSYN) 500 mg tablet Take 500 mg by mouth two (2) times daily (with meals).  22   Provider, Historical   acetaminophen (TYLENOL) 325 mg tablet Take 2 Tablets by mouth every six (6) hours as needed for Pain. Patient not taking: Reported on 2/7/2023 7/24/22   Skip Langston MD   methocarbamoL (Robaxin-750) 750 mg tablet Take 1 Tablet by mouth three (3) times daily as needed for Muscle Spasm(s). Patient not taking: Reported on 2/7/2023 7/24/22   Skip Langston MD   ibuprofen (MOTRIN) 600 mg tablet Take 1 Tablet by mouth three (3) times daily as needed for Pain. 7/24/22   Skip Langston MD   ibuprofen (MOTRIN) 400 mg tablet Take 1 Tab by mouth every six (6) hours as needed for Pain. 4/21/21   Genoveva Carroll NP   ondansetron (Zofran ODT) 4 mg disintegrating tablet Take 1 Tab by mouth every eight (8) hours as needed for Nausea or Vomiting. Patient not taking: Reported on 2/7/2023 4/21/21   Genoveva Carroll NP   meloxicam (MOBIC) 15 mg tablet TAKE 1 TABLET BY MOUTH EVERY DAY  Patient not taking: Reported on 2/7/2023 4/15/21   Salo Mcclellan Rumpf, DPM   albuterol sulfate (PROVENTIL;VENTOLIN) 2.5 mg/0.5 mL nebu nebulizer solution by Nebulization route once. Provider, Historical   montelukast (SINGULAIR) 10 mg tablet Take 10 mg by mouth daily. Provider, Historical   hydroCHLOROthiazide (HYDRODIURIL) 25 mg tablet Take 25 mg by mouth daily. Provider, Historical   Omeprazole delayed release (PRILOSEC D/R) 20 mg tablet Take 40 mg by mouth daily. Provider, Historical   terconazole (TERAZOL 7) 0.4 % vaginal cream Insert 1 Applicator into vagina nightly. Patient not taking: Reported on 2/7/2023 8/3/20   Charbel Phillips MD       Review of Systems   Constitutional:  Negative for chills, diaphoresis, fever and malaise/fatigue. Respiratory:  Negative for cough, hemoptysis, sputum production, shortness of breath and wheezing. No cyanosis   Cardiovascular:  Negative for chest pain, palpitations, claudication and leg swelling. Gastrointestinal:  Negative for abdominal pain, constipation, diarrhea, heartburn, nausea and vomiting.    Genitourinary:  Negative for frequency. Musculoskeletal:  Positive for joint pain. Negative for back pain, myalgias and neck pain. Skin:  Negative for itching and rash. Neurological:  Negative for tingling and headaches. Alert and oriented x 4. Endo/Heme/Allergies:  Negative for polydipsia. Psychiatric/Behavioral:  Negative for depression and memory loss. The patient is not nervous/anxious. Objective:     Visit Vitals  /83 (BP 1 Location: Left upper arm, BP Patient Position: Sitting, BP Cuff Size: Adult)   Pulse 72   Temp 97.5 °F (36.4 °C)   Resp 16   Ht 5' 11\" (1.803 m)   Wt 261 lb 6 oz (118.6 kg)   BMI 36.45 kg/m²       GEN: Pt is AAOx4 and in NAD. No dressing noted to B/L LE. No family noted at [facility]  DERM: No wounds noted to B/L LE. No dry skin noted to B/L LE. No proximal lymphatic streaking noted to B/L LE. NCSOI noted to B/L LE  VASC: Pedal pulses (DP/PT) palpable to B/L LE. CFT<3sec to all digits of B/L LE. Pedal hair growth noted to the level of the digits for B/L LE. Skin temp is warm to warm from proximal to distal for B/L LE. Neg homans/morelia signs to B/L LE. No varicosities or telangectasias noted to B/L LE.  NEURO: Protective and epicritic sensations grossly intact to B/L LE  MSK: (-) POP, No gross deformities. Good muscle tone and bulk noted to B/L LE.  PSYCH: Cooperative with normal mood and affect     Data Review: No results found for this or any previous visit (from the past 24 hour(s)). Assessment:     Diagnoses and all orders for this visit:    1. Peroneal tendonitis of left lower extremity  -     REFERRAL TO PHYSICAL THERAPY    2. Peroneal tendonitis of right lower extremity  -     REFERRAL TO PHYSICAL THERAPY    3. Plantar fasciitis  -     REFERRAL TO PHYSICAL THERAPY    4. Hypertrophy of nail    Other orders  -     methylPREDNISolone (MEDROL DOSEPACK) 4 mg tablet; As directed         Plan:     Patient seen and evaluated in the office.   Discussed with patient different treatment for her peroneal tendinitis. Refer patient to physical therapy. Prescribed Medrol Dosepak take as directed. Discussed with patient to continue stretching Achilles tendon to help with her plantar fascia. It was determined that a biopsy of the nail unit would be taken for diagnostic purposes. A small portion of the nail unit (eg, plate, bed, matrix, hyponychium, proximal and lateral nail folds) was sharply removed from the right great toenail unit and sent to pathology for analysis. Anesthesia and hemostasis was utilized as needed. Wound care performed as needed. Pt tolerated well. Instructed pt that when pathology results return, will discuss tx options in more depth.              Zoran Guerra DPM, CWSP, [address] [place] [address], [facility]7 [facility]  Suzanna [place]: [phone]  F: [phone]

## 2023-03-23 RX ORDER — AMMONIUM LACTATE 12 G/100G
CREAM TOPICAL 2 TIMES DAILY
Qty: 280 G | Refills: 0 | Status: SHIPPED | OUTPATIENT
Start: 2023-03-23

## 2023-04-10 ENCOUNTER — HOSPITAL ENCOUNTER (OUTPATIENT)
Dept: PHYSICAL THERAPY | Age: 58
Discharge: HOME | End: 2023-04-10
Payer: COMMERCIAL

## 2023-04-10 PROCEDURE — 97110 THERAPEUTIC EXERCISES: CPT

## 2023-04-10 PROCEDURE — 97140 MANUAL THERAPY 1/> REGIONS: CPT

## 2023-04-19 ENCOUNTER — HOSPITAL ENCOUNTER (OUTPATIENT)
Dept: PHYSICAL THERAPY | Age: 58
Discharge: HOME | End: 2023-04-19
Payer: COMMERCIAL

## 2023-04-19 PROCEDURE — 97140 MANUAL THERAPY 1/> REGIONS: CPT

## 2023-04-19 PROCEDURE — 97110 THERAPEUTIC EXERCISES: CPT

## 2023-05-01 ENCOUNTER — APPOINTMENT (OUTPATIENT)
Facility: HOSPITAL | Age: 58
End: 2023-05-01
Payer: COMMERCIAL

## 2023-05-02 ENCOUNTER — HOSPITAL ENCOUNTER (OUTPATIENT)
Dept: PHYSICAL THERAPY | Age: 58
Discharge: HOME | End: 2023-05-02
Payer: COMMERCIAL

## 2023-05-02 PROCEDURE — 9990 CHARGE CONVERSION

## 2023-05-02 PROCEDURE — 97110 THERAPEUTIC EXERCISES: CPT | Performed by: PHYSICAL THERAPIST

## 2023-05-02 PROCEDURE — 97140 MANUAL THERAPY 1/> REGIONS: CPT

## 2023-05-02 PROCEDURE — 97110 THERAPEUTIC EXERCISES: CPT

## 2023-05-02 PROCEDURE — 97140 MANUAL THERAPY 1/> REGIONS: CPT | Performed by: PHYSICAL THERAPIST

## 2023-05-08 ENCOUNTER — APPOINTMENT (OUTPATIENT)
Facility: HOSPITAL | Age: 58
End: 2023-05-08
Payer: COMMERCIAL

## 2023-05-08 ENCOUNTER — APPOINTMENT (OUTPATIENT)
Dept: PHYSICAL THERAPY | Age: 58
End: 2023-05-08
Payer: COMMERCIAL

## 2023-05-17 ENCOUNTER — HOSPITAL ENCOUNTER (OUTPATIENT)
Facility: HOSPITAL | Age: 58
Setting detail: RECURRING SERIES
Discharge: HOME | End: 2023-05-20
Payer: COMMERCIAL

## 2023-05-17 PROCEDURE — 97140 MANUAL THERAPY 1/> REGIONS: CPT

## 2023-05-17 PROCEDURE — 97110 THERAPEUTIC EXERCISES: CPT

## 2023-05-17 NOTE — PROGRESS NOTES
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 So far patient attended 5 visits of skilled PT. Pt reports not much change she still c/o B feet pain, she has been doing her stretches and icing but she is still very swollen. She is also wearing her orthotics all day, but no change in pain. Her highest pain is 9/10 on the dorsum of her feet and plantar of her feet and along MT head. She continues to presents pocket swelling on L>R foot. Pt stated her lowest pain is 5/10. Pt reports functional limitation with standing/WB also when she gets up in the morning the pain is worse. During today's assessment we noted improvement in swelling from evaluation but left foot is still much more swollen then R foot. She improved in all her ankle AROM but in DF due to pain and limitation with movement. Her single leg stand slightly improved and her strength is unremarkable but she presents with large adhesions along plantar of her feet. Pt was given a HEP to focus on AROM and stretches. She was also told to be more compliant with her sessions and we will try [address] next visit. Pt reports some reduction of pain post manual therapy.  Patient will continue to benefit from skilled PT / OT services to modify and progress therapeutic interventions, analyze and address functional mobility deficits, analyze
limitation with movement. Her single leg stand slightly improved and her strength is unremarkable but she presents with large adhesions along plantar of her feet. Pt was given a HEP to focus on AROM and stretches. She was also told to be more compliant with her sessions and we will try [address] next visit. Pt reports some reduction of pain post manual therapy. Patient will continue to benefit from skilled PT / OT services to modify and progress therapeutic interventions, analyze and address functional mobility deficits, analyze and address ROM deficits, analyze and address strength deficits, analyze and address soft tissue restrictions, analyze and cue for proper movement patterns, and analyze and modify for postural abnormalities to address functional deficits and attain remaining goals. Progress toward goals / Updated goals:  []  See Progress Note/Recertification    [facility] Score:  Initial: 6.88%     Patient Goal (s): get rid of pain    [] Met [x] Not met [] Partially met  Date: 5/17      Short Term Goals: To be accomplished in 6-8 treatments. Pt will be independent with HEP to assist with progression of therapy and prevent delays/soreness. [x] Met [] Not met [] Partially met 5/17       Pt will use ice/heat/massage as instructed to assist with inflammation, swelling,and pain management to prevent pain > 3-4/10 on VAS. [] Met [] Not met [x] Partially met (uses ice and bottle water) pain is 5-9/10       Patient will trial resting night splint to see if it improves morning pain. [] Met [x] Not met [] Partially met Date: 5/17 it make her feet hurt more      Long Term Goals: To be accomplished in 12-16 treatments. Pt will be able to perform SLS for 10 seconds B without increased pain to improve balance for safe ambulation in the home.       [] Met [] Not met [x] Partially met Date: L 10 sec on 2nd trial / R 8 sec on 1st trail 5/17      Pt will have less swelling by 1/2 cm to improve flexibility and

## 2023-10-25 NOTE — TELEPHONE ENCOUNTER
LOV 04-11-23  No future OV scheduled    Pharmacy requesting refill on Diclofenac 1% gel; disp 100 grams; LFD 08-09-23    Prescription pended for provider approval, if appropriate.

## 2023-11-03 ENCOUNTER — OFFICE VISIT (OUTPATIENT)
Age: 58
End: 2023-11-03
Payer: COMMERCIAL

## 2023-11-03 VITALS
HEIGHT: 71 IN | BODY MASS INDEX: 36.57 KG/M2 | HEART RATE: 80 BPM | OXYGEN SATURATION: 98 % | DIASTOLIC BLOOD PRESSURE: 82 MMHG | SYSTOLIC BLOOD PRESSURE: 120 MMHG | TEMPERATURE: 98 F | RESPIRATION RATE: 20 BRPM | WEIGHT: 261.2 LBS

## 2023-11-03 DIAGNOSIS — L65.9 ALOPECIA: ICD-10-CM

## 2023-11-03 DIAGNOSIS — R94.6 BORDERLINE ABNORMAL TFTS: Primary | ICD-10-CM

## 2023-11-03 DIAGNOSIS — R94.6 BORDERLINE ABNORMAL TFTS: ICD-10-CM

## 2023-11-03 PROCEDURE — 99214 OFFICE O/P EST MOD 30 MIN: CPT | Performed by: STUDENT IN AN ORGANIZED HEALTH CARE EDUCATION/TRAINING PROGRAM

## 2023-11-03 RX ORDER — FLUTICASONE PROPIONATE 50 MCG
SPRAY, SUSPENSION (ML) NASAL
COMMUNITY
Start: 2023-10-03

## 2023-11-03 RX ORDER — KETOCONAZOLE 20 MG/ML
SHAMPOO TOPICAL
COMMUNITY
Start: 2023-08-16

## 2023-11-03 RX ORDER — MOMETASONE FUROATE 1 MG/G
OINTMENT TOPICAL
COMMUNITY
Start: 2023-08-16

## 2023-11-03 RX ORDER — LORATADINE 10 MG/1
10 TABLET ORAL DAILY
COMMUNITY
Start: 2023-10-03

## 2023-11-03 RX ORDER — ERGOCALCIFEROL 1.25 MG/1
CAPSULE ORAL
COMMUNITY
Start: 2021-10-05

## 2023-11-03 RX ORDER — IBUPROFEN 800 MG/1
TABLET ORAL
COMMUNITY
Start: 2023-08-09

## 2023-11-03 RX ORDER — PROMETHAZINE HYDROCHLORIDE 25 MG/1
TABLET ORAL
COMMUNITY
End: 2023-11-03

## 2023-11-03 RX ORDER — CHOLECALCIFEROL (VITAMIN D3) 125 MCG
1 CAPSULE ORAL
COMMUNITY
Start: 2023-09-27

## 2023-11-03 RX ORDER — GABAPENTIN 300 MG/1
CAPSULE ORAL
COMMUNITY

## 2023-11-03 RX ORDER — OXYCODONE HYDROCHLORIDE AND ACETAMINOPHEN 5; 325 MG/1; MG/1
1 TABLET ORAL EVERY 4 HOURS PRN
COMMUNITY
Start: 2020-09-04 | End: 2023-11-03

## 2023-11-03 NOTE — PROGRESS NOTES
Maegan Barrios M.D          Patient Information  Date:11/3/2023  Name : Romaine Nunez 59 y.o.     YOB: 1965         Referred by: Kailyn Maynard MD       Chief Complaint   Patient presents with    New Patient    Thyroid Problem       History of Present Illness: Romaine Nunez is a 59 y.o. female  with asthma who presented for evaluation of abnormal thyroid function tests. Reports she noticed a bald patch on her head. Went to PCP and dermatologist  Had thyroid labs checked showing abnormal T4. Denies changes in weight,  bowel habits, denies difficulty swallowing, denies nausea or vomiting. Denies insomnia.      Menopausal.       Past Medical History:   Diagnosis Date    Asthma     History of total abdominal hysterectomy        Past Surgical History:   Procedure Laterality Date    HYSTERECTOMY, TOTAL ABDOMINAL (CERVIX REMOVED)      IR URETEROSTOMY TUBE URETERAL STENT CHANGE      KIDNEY STONE REMOVAL  2021        Social History     Socioeconomic History    Marital status:      Spouse name: Not on file    Number of children: Not on file    Years of education: Not on file    Highest education level: Not on file   Occupational History    Not on file   Tobacco Use    Smoking status: Never    Smokeless tobacco: Never   Substance and Sexual Activity    Alcohol use: Not Currently    Drug use: Never    Sexual activity: Not on file   Other Topics Concern    Not on file   Social History Narrative    Not on file     Social Determinants of Health     Financial Resource Strain: Not on file   Food Insecurity: Not on file   Transportation Needs: Not on file   Physical Activity: Not on file   Stress: Not on file   Social Connections: Not on file   Intimate Partner Violence: Not on file   Housing Stability: Not on file       Family History   Problem Relation Age

## 2023-11-03 NOTE — PATIENT INSTRUCTIONS
1.Your T4 was elevated, but I would like to check a Free T4 because T4 can be affected by medications, weight

## 2024-03-11 ENCOUNTER — TELEPHONE (OUTPATIENT)
Age: 59
End: 2024-03-11

## 2024-03-11 NOTE — TELEPHONE ENCOUNTER
03/11/24 9:03AM  Spw patient as she is calling to inform she is having terrible foot pain, can barely walk on her foot, the soonest available appt for her is 03/19/24 at 3:00PM, however she didn't know if something could be called in for her for the pain as she does not want to go to the ER for them to tell her to come see Dr. Khan---patient is scheduled for 3/19/24 and has been added to the wait list if there is a cancellation--any questions patient can be reached at [phone].

## 2024-03-19 ENCOUNTER — OFFICE VISIT (OUTPATIENT)
Age: 59
End: 2024-03-19

## 2024-03-19 VITALS
SYSTOLIC BLOOD PRESSURE: 149 MMHG | WEIGHT: 261 LBS | OXYGEN SATURATION: 95 % | HEIGHT: 71 IN | TEMPERATURE: 98 F | BODY MASS INDEX: 36.54 KG/M2 | HEART RATE: 73 BPM | DIASTOLIC BLOOD PRESSURE: 92 MMHG

## 2024-03-19 DIAGNOSIS — M76.822 POSTERIOR TIBIAL TENDINITIS OF LEFT LEG: Primary | ICD-10-CM

## 2024-03-19 DIAGNOSIS — M21.41 PES PLANUS OF BOTH FEET: ICD-10-CM

## 2024-03-19 DIAGNOSIS — M21.42 PES PLANUS OF BOTH FEET: ICD-10-CM

## 2024-03-19 ASSESSMENT — ENCOUNTER SYMPTOMS
SHORTNESS OF BREATH: 0
VOMITING: 0
ABDOMINAL PAIN: 0
DIARRHEA: 0

## 2024-03-19 NOTE — PROGRESS NOTES
[place] [facility]         Patient Name: Lazaro Anguiano    : 1965    Visit Date: 3/19/2024    Office Visit Note      Subjective:         Patient is a 58 y.o. female who is being seen in office follow up visit for pain to the left foot.  Patient states that last months she had intense pain to the left foot with no trauma.  Patient has been applying diclofenac gel 1% cream.  Pain has improved and she describes pain as sharp shooting pain.    Past Medical History:   Diagnosis Date    Asthma     History of total abdominal hysterectomy      Past Surgical History:   Procedure Laterality Date    HYSTERECTOMY, TOTAL ABDOMINAL (CERVIX REMOVED)      IR URETEROSTOMY TUBE URETERAL STENT CHANGE      KIDNEY STONE REMOVAL         Family History   Problem Relation Age of Onset    Hypertension Father     Lung Cancer Mother     Breast Cancer Mother     Diabetes Father       Social History     Tobacco Use    Smoking status: Never    Smokeless tobacco: Never   Substance Use Topics    Alcohol use: Not Currently     No Known Allergies  Prior to Admission medications    Medication Sig Start Date End Date Taking? Authorizing Provider   diclofenac sodium (VOLTAREN) 1 % GEL Apply topically 2 times daily 3/11/24  Yes Wale Khan DPM   Cholecalciferol (VITAMIN D3) 50 MCG ( UT) TABS Take 1 tablet by mouth 23  Yes Gaye Ayala MD   fluticasone (FLONASE) 50 MCG/ACT nasal spray USE 1 SPRAY IN EACH NOSTRIL DAILY 10/3/23  Yes Gaye Ayala MD   gabapentin (NEURONTIN) 300 MG capsule gabapentin 300 mg capsule   Take 1 capsule 3 times a day by oral route for 90 days.   Yes Gaye Ayala MD   ibuprofen (ADVIL;MOTRIN) 800 MG tablet 1 TABLET 3 TIMES A DAY AS NEEDED WITH FOOD FOR 90 DAYS 23  Yes Gaye Ayala MD   ketoconazole (NIZORAL) 2 % shampoo PLEASE SEE ATTACHED FOR DETAILED DIRECTIONS 23  Yes Gaye Ayala MD   loratadine (CLARITIN) 10 MG tablet Take 1

## 2024-03-19 NOTE — PROGRESS NOTES
Chief Complaint   Patient presents with    Follow-up    Foot Pain     BP (!) 149/92 (Site: Left Upper Arm, Position: Sitting, Cuff Size: Large Adult)   Pulse 73   Temp 98 °F (36.7 °C) (Temporal)   Ht 1.803 m (5' 11\")   Wt 118.4 kg (261 lb)   SpO2 95%   BMI 36.40 kg/m²     1. Have you been to the ER, urgent care clinic since your last visit?  Hospitalized since your last visit?No    2. Have you seen or consulted any other health care providers outside of the [facility] since your last visit?  Include any pap smears or colon screening. No

## 2024-03-23 ENCOUNTER — HOSPITAL ENCOUNTER (EMERGENCY)
Facility: HOSPITAL | Age: 59
Discharge: HOME | End: 2024-03-23
Payer: COMMERCIAL

## 2024-03-23 ENCOUNTER — APPOINTMENT (OUTPATIENT)
Facility: HOSPITAL | Age: 59
End: 2024-03-23
Payer: COMMERCIAL

## 2024-03-23 VITALS
TEMPERATURE: 99 F | BODY MASS INDEX: 36.54 KG/M2 | RESPIRATION RATE: 16 BRPM | WEIGHT: 261 LBS | HEART RATE: 78 BPM | HEIGHT: 71 IN | DIASTOLIC BLOOD PRESSURE: 96 MMHG | SYSTOLIC BLOOD PRESSURE: 153 MMHG | OXYGEN SATURATION: 98 %

## 2024-03-23 DIAGNOSIS — M79.672 LEFT FOOT PAIN: Primary | ICD-10-CM

## 2024-03-23 PROCEDURE — 99284 EMERGENCY DEPT VISIT MOD MDM: CPT

## 2024-03-23 PROCEDURE — 6360000002 HC RX W HCPCS

## 2024-03-23 PROCEDURE — 96372 THER/PROPH/DIAG INJ SC/IM: CPT

## 2024-03-23 PROCEDURE — 73630 X-RAY EXAM OF FOOT: CPT

## 2024-03-23 RX ORDER — KETOROLAC TROMETHAMINE 30 MG/ML
30 INJECTION, SOLUTION INTRAMUSCULAR; INTRAVENOUS
Status: COMPLETED | OUTPATIENT
Start: 2024-03-23 | End: 2024-03-23

## 2024-03-23 RX ORDER — KETOROLAC TROMETHAMINE 10 MG/1
10 TABLET, FILM COATED ORAL EVERY 6 HOURS PRN
Qty: 20 TABLET | Refills: 0 | Status: SHIPPED | OUTPATIENT
Start: 2024-03-23

## 2024-03-23 RX ADMIN — KETOROLAC TROMETHAMINE 30 MG: 30 INJECTION, SOLUTION INTRAMUSCULAR at 18:40

## 2024-03-23 ASSESSMENT — PAIN DESCRIPTION - LOCATION: LOCATION: FOOT

## 2024-03-23 ASSESSMENT — PAIN SCALES - GENERAL: PAINLEVEL_OUTOF10: 10

## 2024-03-23 NOTE — ED TRIAGE NOTES
Pt complains of left foot pain that started about a week ago. States she has a hx of plantar fasciitis in bilateral feet. Denies injury. Was seen at PCP for the same and states she got an injection.

## 2024-03-23 NOTE — DISCHARGE INSTRUCTIONS
Thank you!  Thank you for allowing me to care for you in the emergency department. It is my goal to provide you with excellent care. If you have not received excellent quality care, please ask to speak to the nurse manager. Please fill out the survey that will come to you by mail or email since we listen to your feedback!     Below you will find a list of your tests from today's visit.  Should you have any questions, please do not hesitate to call the emergency department.    Labs  No results found for this or any previous visit (from the past 12 hour(s)).    Radiologic Studies  XR FOOT LEFT (MIN 3 VIEWS)   Final Result   No acute abnormality.        [unfilled]  [unfilled]  ------------------------------------------------------------------------------------------------------------  The exam and treatment you received in the Emergency Department were for an urgent problem and are not intended as complete care. It is important that you follow-up with a doctor, nurse practitioner, or physician assistant to:  (1) confirm your diagnosis,  (2) re-evaluation of changes in your illness and treatment, and  (3) for ongoing care. Please take your discharge instructions with you when you go to your follow-up appointment.     If you have any problem arranging a follow-up appointment, contact the Emergency Department.  If your symptoms become worse or you do not improve as expected and you are unable to reach your health care provider, please return to the Emergency Department. We are available 24 hours a day.     If a prescription has been provided, please have it filled as soon as possible to prevent a delay in treatment. If you have any questions or reservations about taking the medication due to side effects or interactions with other medications, please call your primary care provider or contact the ER.

## 2024-03-24 NOTE — ED PROVIDER NOTES
[facility] EMERGENCY DEPT  EMERGENCY DEPARTMENT HISTORY AND PHYSICAL EXAM      Date: 3/23/2024  Patient Name: Lazaro Anguiano  MRN: [id number]  YOB: 1965  Date of evaluation: 3/23/2024  Provider: Arti Adamson PA-C   Note Started: 1:27 AM EDT 3/24/24    HISTORY OF PRESENT ILLNESS     Chief Complaint   Patient presents with    Foot Pain       History Provided By: Patient    HPI: Lazaro Anguiano is a 58 y.o. female with past medical history of asthma and plantar fasciitis, presents for left foot pain.  Patient states that a few days ago she was seen at podiatry, she was given an injection of steroids.  Patient states that it helped for about 2 days, now states that she \"feels like she is back to square one\".  She endorses pain and swelling, similar to previous. She  has not taken any additional medication at home to help with her symptoms.  She denies any fevers, chills, or injury.     PAST MEDICAL HISTORY   Past Medical History:  Past Medical History:   Diagnosis Date    Asthma     History of total abdominal hysterectomy        Past Surgical History:  Past Surgical History:   Procedure Laterality Date    HYSTERECTOMY, TOTAL ABDOMINAL (CERVIX REMOVED)      IR URETEROSTOMY TUBE URETERAL STENT CHANGE      KIDNEY STONE REMOVAL  2021       Family History:  Family History   Problem Relation Age of Onset    Hypertension Father     Lung Cancer Mother     Breast Cancer Mother     Diabetes Father        Social History:  Social History     Tobacco Use    Smoking status: Never    Smokeless tobacco: Never   Substance Use Topics    Alcohol use: Not Currently    Drug use: Never       Allergies:  No Known Allergies    PCP: David Farias MD    Current Meds:   No current facility-administered medications for this encounter.     Current Outpatient Medications   Medication Sig Dispense Refill    ketorolac (TORADOL) 10 MG tablet Take 1 tablet by mouth every 6 hours as needed for Pain 20 tablet 0    diclofenac sodium

## 2024-07-26 ENCOUNTER — HOSPITAL ENCOUNTER (EMERGENCY)
Facility: HOSPITAL | Age: 59
Discharge: HOME | End: 2024-07-26
Payer: COMMERCIAL

## 2024-07-26 VITALS
HEIGHT: 71 IN | OXYGEN SATURATION: 100 % | DIASTOLIC BLOOD PRESSURE: 83 MMHG | HEART RATE: 84 BPM | SYSTOLIC BLOOD PRESSURE: 149 MMHG | BODY MASS INDEX: 36.4 KG/M2 | WEIGHT: 260 LBS | TEMPERATURE: 97.2 F | RESPIRATION RATE: 17 BRPM

## 2024-07-26 DIAGNOSIS — N30.00 ACUTE CYSTITIS WITHOUT HEMATURIA: ICD-10-CM

## 2024-07-26 DIAGNOSIS — J40 BRONCHITIS: Primary | ICD-10-CM

## 2024-07-26 LAB
APPEARANCE UR: CLEAR
BACTERIA URNS QL MICRO: ABNORMAL /HPF
BILIRUB UR QL: NEGATIVE
COLOR UR: YELLOW
EPITH CASTS URNS QL MICRO: ABNORMAL /LPF
GLUCOSE UR STRIP.AUTO-MCNC: NEGATIVE MG/DL
HGB UR QL STRIP: ABNORMAL
KETONES UR QL STRIP.AUTO: NEGATIVE MG/DL
LEUKOCYTE ESTERASE UR QL STRIP.AUTO: ABNORMAL
NITRITE UR QL STRIP.AUTO: NEGATIVE
PH UR STRIP: 6 (ref 5–8)
PROT UR STRIP-MCNC: 30 MG/DL
RBC #/AREA URNS HPF: ABNORMAL /HPF (ref 0–5)
SARS-COV-2 RNA RESP QL NAA+PROBE: NOT DETECTED
SP GR UR REFRACTOMETRY: 1.02 (ref 1–1.03)
SPECIMEN SOURCE: NORMAL
URINE CULTURE IF INDICATED: ABNORMAL
UROBILINOGEN UR QL STRIP.AUTO: 1 EU/DL (ref 0.2–1)
WBC URNS QL MICRO: ABNORMAL /HPF (ref 0–4)

## 2024-07-26 PROCEDURE — 99283 EMERGENCY DEPT VISIT LOW MDM: CPT

## 2024-07-26 PROCEDURE — 87635 SARS-COV-2 COVID-19 AMP PRB: CPT

## 2024-07-26 PROCEDURE — 87086 URINE CULTURE/COLONY COUNT: CPT

## 2024-07-26 PROCEDURE — 81001 URINALYSIS AUTO W/SCOPE: CPT

## 2024-07-26 RX ORDER — LORATADINE 10 MG/1
10 TABLET ORAL DAILY
Qty: 30 TABLET | Refills: 0 | Status: SHIPPED | OUTPATIENT
Start: 2024-07-26

## 2024-07-26 RX ORDER — CEPHALEXIN 500 MG/1
500 CAPSULE ORAL 2 TIMES DAILY
Qty: 14 CAPSULE | Refills: 0 | Status: SHIPPED | OUTPATIENT
Start: 2024-07-26

## 2024-07-26 RX ORDER — DEXTROMETHORPHAN HYDROBROMIDE AND PROMETHAZINE HYDROCHLORIDE 15; 6.25 MG/5ML; MG/5ML
5 SYRUP ORAL 4 TIMES DAILY PRN
Qty: 118 ML | Refills: 0 | Status: SHIPPED | OUTPATIENT
Start: 2024-07-26 | End: 2024-08-02

## 2024-07-26 RX ORDER — ALBUTEROL SULFATE 90 UG/1
2 AEROSOL, METERED RESPIRATORY (INHALATION) 4 TIMES DAILY PRN
Qty: 1 EACH | Refills: 0 | Status: SHIPPED | OUTPATIENT
Start: 2024-07-26

## 2024-07-26 RX ORDER — PREDNISONE 20 MG/1
40 TABLET ORAL DAILY
Qty: 8 TABLET | Refills: 0 | Status: SHIPPED | OUTPATIENT
Start: 2024-07-26 | End: 2024-07-30

## 2024-07-26 NOTE — ED TRIAGE NOTES
Urinary freq and cough   
pt is A&Ox4, in & out of sleep, easily accusable to voice & touch. CIWA of 10

## 2024-07-26 NOTE — DISCHARGE INSTRUCTIONS
Thank you!  Thank you for allowing me to care for you in the emergency department. It is my goal to provide you with excellent care. If you have not received excellent quality care, please ask to speak to the nurse manager. Please fill out the survey that will come to you by mail or email since we listen to your feedback!     Below you will find a list of your tests from today's visit.  Should you have any questions, please do not hesitate to call the emergency department.    Labs  Recent Results (from the past 12 hour(s))   Urinalysis with Reflex to Culture    Collection Time: 07/26/24  1:40 PM    Specimen: Urine   Result Value Ref Range    Color, UA Yellow      Appearance Clear Clear      Specific Gravity, UA 1.020 1.003 - 1.030      pH, Urine 6.0 5.0 - 8.0      Protein, UA 30 (A) Negative mg/dL    Glucose, Ur Negative Negative mg/dL    Ketones, Urine Negative Negative mg/dL    Bilirubin, Urine Negative Negative      Blood, Urine Small (A) Negative      Urobilinogen, Urine 1.0 0.2 - 1.0 EU/dL    Nitrite, Urine Negative Negative      Leukocyte Esterase, Urine Large (A) Negative      WBC, UA 20-50 0 - 4 /hpf    RBC, UA 5-10 0 - 5 /hpf    Epithelial Cells, UA Moderate (A) Few /lpf    BACTERIA, URINE 1+ (A) Negative /hpf    Urine Culture if Indicated Urine Culture Ordered (A) Culture not indicated by UA result     COVID-19, Rapid    Collection Time: 07/26/24  1:40 PM    Specimen: Nasopharyngeal   Result Value Ref Range    Source Nasopharyngeal      SARS-CoV-2, PCR Not Detected         Radiologic Studies  No orders to display     [unfilled]  [unfilled]  ------------------------------------------------------------------------------------------------------------  The exam and treatment you received in the Emergency Department were for an urgent problem and are not intended as complete care. It is important that you follow-up with a doctor, nurse practitioner, or physician assistant to:  (1) confirm your diagnosis,  (2)

## 2024-07-26 NOTE — ED PROVIDER NOTES
spray  Commonly known as: FLONASE     gabapentin 300 MG capsule  Commonly known as: NEURONTIN     hydroCHLOROthiazide 25 MG tablet  Commonly known as: HYDRODIURIL     ibuprofen 800 MG tablet  Commonly known as: ADVIL;MOTRIN     ketoconazole 2 % shampoo  Commonly known as: NIZORAL     ketorolac 10 MG tablet  Commonly known as: TORADOL  Take 1 tablet by mouth every 6 hours as needed for Pain     methocarbamol 750 MG tablet  Commonly known as: ROBAXIN     mometasone 0.1 % ointment  Commonly known as: ELOCON     montelukast 10 MG tablet  Commonly known as: SINGULAIR     naproxen 500 MG tablet  Commonly known as: NAPROSYN     omeprazole 20 MG tablet  Commonly known as: PRILOSEC OTC     vitamin D 1.25 MG (68335 UT) Caps capsule  Commonly known as: ERGOCALCIFEROL     Vitamin D3 50 MCG (2000 UT) Tabs     Wixela Inhub 250-50 MCG/ACT Aepb diskus inhaler  Generic drug: fluticasone-salmeterol           * This list has 2 medication(s) that are the same as other medications prescribed for you. Read the directions carefully, and ask your doctor or other care provider to review them with you.                   Where to Get Your Medications        These medications were sent to [facility]/pharmacy [address] [place] [address] - [address] [place] [address] - P [phone] - F [phone]  [address] [place] [id number]      Phone: [phone]   albuterol sulfate  (90 Base) MCG/ACT inhaler  cephALEXin 500 MG capsule  loratadine 10 MG tablet  predniSONE 20 MG tablet  promethazine-dextromethorphan 6.25-15 MG/5ML syrup           DISCONTINUED MEDICATIONS:  Current Discharge Medication List        STOP taking these medications       albuterol (PROVENTIL) (5 MG/ML) 0.5% nebulizer solution Comments:   Reason for Stopping:               I am the Primary Clinician of Record: Arti Adamson PA-C (electronically signed)    (Please note that parts of this dictation were completed with voice recognition software. Quite often unanticipated

## 2024-07-28 LAB
BACTERIA SPEC CULT: ABNORMAL
BACTERIA SPEC CULT: ABNORMAL
COLONY COUNT, CNT: ABNORMAL
Lab: ABNORMAL

## 2024-07-29 LAB
BACTERIA SPEC CULT: ABNORMAL
COLONY COUNT, CNT: ABNORMAL
Lab: ABNORMAL

## 2024-08-15 ENCOUNTER — HOSPITAL ENCOUNTER (EMERGENCY)
Facility: HOSPITAL | Age: 59
Discharge: HOME | End: 2024-08-16
Payer: COMMERCIAL

## 2024-08-15 ENCOUNTER — APPOINTMENT (OUTPATIENT)
Facility: HOSPITAL | Age: 59
End: 2024-08-15
Payer: COMMERCIAL

## 2024-08-15 DIAGNOSIS — N10 ACUTE PYELONEPHRITIS: Primary | ICD-10-CM

## 2024-08-15 DIAGNOSIS — N13.30 HYDRONEPHROSIS, UNSPECIFIED HYDRONEPHROSIS TYPE: ICD-10-CM

## 2024-08-15 LAB
ALBUMIN SERPL-MCNC: 3.7 G/DL (ref 3.5–5)
ALBUMIN/GLOB SERPL: 0.9 (ref 1.1–2.2)
ALP SERPL-CCNC: 87 U/L (ref 45–117)
ALT SERPL-CCNC: 20 U/L (ref 12–78)
ANION GAP SERPL CALC-SCNC: 6 MMOL/L (ref 5–15)
APPEARANCE UR: CLEAR
AST SERPL W P-5'-P-CCNC: 18 U/L (ref 15–37)
BACTERIA URNS QL MICRO: NEGATIVE /HPF
BASOPHILS # BLD: 0 K/UL (ref 0–0.1)
BASOPHILS NFR BLD: 0 % (ref 0–1)
BILIRUB SERPL-MCNC: 1.2 MG/DL (ref 0.2–1)
BILIRUB UR QL: NEGATIVE
BUN SERPL-MCNC: 15 MG/DL (ref 6–20)
BUN/CREAT SERPL: 14 (ref 12–20)
CA-I BLD-MCNC: 9.3 MG/DL (ref 8.5–10.1)
CHLORIDE SERPL-SCNC: 109 MMOL/L (ref 97–108)
CO2 SERPL-SCNC: 24 MMOL/L (ref 21–32)
COLOR UR: YELLOW
CREAT SERPL-MCNC: 1.09 MG/DL (ref 0.55–1.02)
DIFFERENTIAL METHOD BLD: ABNORMAL
EOSINOPHIL # BLD: 0 K/UL (ref 0–0.4)
EOSINOPHIL NFR BLD: 0 % (ref 0–7)
EPITH CASTS URNS QL MICRO: ABNORMAL /LPF
ERYTHROCYTE [DISTWIDTH] IN BLOOD BY AUTOMATED COUNT: 14.2 % (ref 11.5–14.5)
GLOBULIN SER CALC-MCNC: 4.3 G/DL (ref 2–4)
GLUCOSE SERPL-MCNC: 96 MG/DL (ref 65–100)
GLUCOSE UR STRIP.AUTO-MCNC: NEGATIVE MG/DL
HCT VFR BLD AUTO: 40 % (ref 35–47)
HGB BLD-MCNC: 13.2 G/DL (ref 11.5–16)
HGB UR QL STRIP: ABNORMAL
IMM GRANULOCYTES # BLD AUTO: 0 K/UL
IMM GRANULOCYTES NFR BLD AUTO: 0 %
KETONES UR QL STRIP.AUTO: 5 MG/DL
LACTATE BLD-SCNC: 1.3 MMOL/L (ref 0.4–2)
LEUKOCYTE ESTERASE UR QL STRIP.AUTO: ABNORMAL
LYMPHOCYTES # BLD: 1.1 K/UL (ref 0.8–3.5)
LYMPHOCYTES NFR BLD: 10 % (ref 12–49)
MCH RBC QN AUTO: 29.8 PG (ref 26–34)
MCHC RBC AUTO-ENTMCNC: 33 G/DL (ref 30–36.5)
MCV RBC AUTO: 90.3 FL (ref 80–99)
MONOCYTES # BLD: 0.8 K/UL (ref 0–1)
MONOCYTES NFR BLD: 7 % (ref 5–13)
MUCOUS THREADS URNS QL MICRO: ABNORMAL /LPF
NEUTS SEG # BLD: 8.8 K/UL (ref 1.8–8)
NEUTS SEG NFR BLD: 83 % (ref 32–75)
NITRITE UR QL STRIP.AUTO: POSITIVE
NRBC # BLD: 0 K/UL (ref 0–0.01)
NRBC BLD-RTO: 0 PER 100 WBC
PERFORMED BY:: NORMAL
PH UR STRIP: 7 (ref 5–8)
PLATELET # BLD AUTO: 234 K/UL (ref 150–400)
PMV BLD AUTO: 10.6 FL (ref 8.9–12.9)
POTASSIUM SERPL-SCNC: 3.9 MMOL/L (ref 3.5–5.1)
PROT SERPL-MCNC: 8 G/DL (ref 6.4–8.2)
PROT UR STRIP-MCNC: NEGATIVE MG/DL
RBC # BLD AUTO: 4.43 M/UL (ref 3.8–5.2)
RBC #/AREA URNS HPF: ABNORMAL /HPF (ref 0–5)
RBC MORPH BLD: ABNORMAL
SODIUM SERPL-SCNC: 139 MMOL/L (ref 136–145)
SP GR UR REFRACTOMETRY: 1.01 (ref 1–1.03)
URINE CULTURE IF INDICATED: ABNORMAL
UROBILINOGEN UR QL STRIP.AUTO: 4 EU/DL (ref 0.1–1)
WBC # BLD AUTO: 10.7 K/UL (ref 3.6–11)
WBC URNS QL MICRO: ABNORMAL /HPF (ref 0–4)

## 2024-08-15 PROCEDURE — 83605 ASSAY OF LACTIC ACID: CPT

## 2024-08-15 PROCEDURE — 99284 EMERGENCY DEPT VISIT MOD MDM: CPT

## 2024-08-15 PROCEDURE — 6360000002 HC RX W HCPCS: Performed by: NURSE PRACTITIONER

## 2024-08-15 PROCEDURE — 80053 COMPREHEN METABOLIC PANEL: CPT

## 2024-08-15 PROCEDURE — 96374 THER/PROPH/DIAG INJ IV PUSH: CPT

## 2024-08-15 PROCEDURE — 87088 URINE BACTERIA CULTURE: CPT

## 2024-08-15 PROCEDURE — 96375 TX/PRO/DX INJ NEW DRUG ADDON: CPT

## 2024-08-15 PROCEDURE — 85025 COMPLETE CBC W/AUTO DIFF WBC: CPT

## 2024-08-15 PROCEDURE — 87086 URINE CULTURE/COLONY COUNT: CPT

## 2024-08-15 PROCEDURE — 74176 CT ABD & PELVIS W/O CONTRAST: CPT

## 2024-08-15 PROCEDURE — 2580000003 HC RX 258: Performed by: NURSE PRACTITIONER

## 2024-08-15 PROCEDURE — 87186 SC STD MICRODIL/AGAR DIL: CPT

## 2024-08-15 PROCEDURE — 81001 URINALYSIS AUTO W/SCOPE: CPT

## 2024-08-15 RX ORDER — PROCHLORPERAZINE EDISYLATE 5 MG/ML
10 INJECTION INTRAMUSCULAR; INTRAVENOUS ONCE
Status: DISCONTINUED | OUTPATIENT
Start: 2024-08-15 | End: 2024-08-16 | Stop reason: HOSPADM

## 2024-08-15 RX ORDER — KETOROLAC TROMETHAMINE 15 MG/ML
15 INJECTION, SOLUTION INTRAMUSCULAR; INTRAVENOUS ONCE
Status: COMPLETED | OUTPATIENT
Start: 2024-08-15 | End: 2024-08-15

## 2024-08-15 RX ORDER — ACETAMINOPHEN 500 MG
1000 TABLET ORAL
Status: DISCONTINUED | OUTPATIENT
Start: 2024-08-15 | End: 2024-08-16 | Stop reason: HOSPADM

## 2024-08-15 RX ORDER — ONDANSETRON 2 MG/ML
4 INJECTION INTRAMUSCULAR; INTRAVENOUS ONCE
Status: COMPLETED | OUTPATIENT
Start: 2024-08-15 | End: 2024-08-15

## 2024-08-15 RX ORDER — 0.9 % SODIUM CHLORIDE 0.9 %
1000 INTRAVENOUS SOLUTION INTRAVENOUS ONCE
Status: COMPLETED | OUTPATIENT
Start: 2024-08-15 | End: 2024-08-16

## 2024-08-15 RX ADMIN — ONDANSETRON 4 MG: 2 INJECTION INTRAMUSCULAR; INTRAVENOUS at 20:45

## 2024-08-15 RX ADMIN — SODIUM CHLORIDE 1000 ML: 9 INJECTION, SOLUTION INTRAVENOUS at 20:45

## 2024-08-15 RX ADMIN — CEFTRIAXONE SODIUM 1000 MG: 1 INJECTION, POWDER, FOR SOLUTION INTRAMUSCULAR; INTRAVENOUS at 21:45

## 2024-08-15 RX ADMIN — KETOROLAC TROMETHAMINE 15 MG: 15 INJECTION, SOLUTION INTRAMUSCULAR; INTRAVENOUS at 20:46

## 2024-08-15 ASSESSMENT — LIFESTYLE VARIABLES
HOW OFTEN DO YOU HAVE A DRINK CONTAINING ALCOHOL: NEVER
HOW MANY STANDARD DRINKS CONTAINING ALCOHOL DO YOU HAVE ON A TYPICAL DAY: PATIENT DOES NOT DRINK

## 2024-08-15 ASSESSMENT — PAIN DESCRIPTION - PAIN TYPE: TYPE: ACUTE PAIN

## 2024-08-15 ASSESSMENT — PAIN DESCRIPTION - LOCATION: LOCATION: ABDOMEN

## 2024-08-15 ASSESSMENT — PAIN SCALES - GENERAL: PAINLEVEL_OUTOF10: 10

## 2024-08-15 ASSESSMENT — PAIN - FUNCTIONAL ASSESSMENT: PAIN_FUNCTIONAL_ASSESSMENT: 0-10

## 2024-08-15 NOTE — ED PROVIDER NOTES
[facility] [place]ERGENCY DEPT  EMERGENCY DEPARTMENT HISTORY AND PHYSICAL EXAM      Date: 8/15/2024  Patient Name: Lazaro Anguiano  MRN: [id number]  Birthdate 1965  Date of evaluation: 8/15/2024  Provider: PRAVEEN Cross NP   Note Started: 7:41 PM EDT 8/15/24    HISTORY OF PRESENT ILLNESS     Chief Complaint   Patient presents with    Abdominal Pain    Emesis       History Provided By: Patient    HPI: Lazaro Anguiano is a 58 y.o. female with a past medical history significant for asthma, kidney stones and ureteral stent presents to the emergency room with cc of nausea.  Patient reports nausea onset this morning.  She states she was recently treated for UTI, completed all her medications though she thinks she may still have an infection.  She endorses urine frequency and burning.  She reports left inguinal pain onset this evening and states she thinks she has another kidney stone.  She also states her blood pressure is significantly elevated and this is common when she has an infection though she admits she did not take her HCTZ this morning.  She denies any fever/chills, vomiting, diarrhea or any known sick contacts.  She has noted to be febrile upon arrival and states she was not aware.  She describes her pain as moderate to severe throbbing light, no aggravating or alleviating factors.  She has not tried taking anything for symptoms.    PAST MEDICAL HISTORY   Past Medical History:  Past Medical History:   Diagnosis Date    Asthma     History of total abdominal hysterectomy        Past Surgical History:  Past Surgical History:   Procedure Laterality Date    HYSTERECTOMY, TOTAL ABDOMINAL (CERVIX REMOVED)      IR URETEROSTOMY TUBE URETERAL STENT CHANGE      KIDNEY STONE REMOVAL  2021       Family History:  Family History   Problem Relation Age of Onset    Hypertension Father     Lung Cancer Mother     Breast Cancer Mother     Diabetes Father        Social History:  Social History     Tobacco Use    Smoking  Critical Care Time, 0 minutes    ED IMPRESSION     1. Acute pyelonephritis    2. Hydronephrosis, unspecified hydronephrosis type          DISPOSITION/PLAN   DISPOSITION Decision To Discharge 08/16/2024 02:38:23 AM  Condition at Disposition: Data Unavailable    Discharge Note: The patient is stable for discharge home. The signs, symptoms, diagnosis, and discharge instructions have been discussed, understanding conveyed, and agreed upon. The patient is to follow up as recommended or return to ER should their symptoms worsen.      PATIENT REFERRED TO:  No follow-up provider specified.      DISCHARGE MEDICATIONS:     Medication List        START taking these medications      ciprofloxacin 500 MG tablet  Commonly known as: CIPRO  Take 1 tablet by mouth 2 times daily for 7 days     ketorolac 10 MG tablet  Commonly known as: TORADOL  Take 1 tablet by mouth every 6 hours as needed for Pain     ondansetron 4 MG disintegrating tablet  Commonly known as: ZOFRAN-ODT  Take 1 tablet by mouth every 8 hours as needed for Nausea or Vomiting            ASK your doctor about these medications      albuterol sulfate  (90 Base) MCG/ACT inhaler  Commonly known as: Ventolin HFA  Inhale 2 puffs into the lungs 4 times daily as needed for Wheezing     ammonium lactate 12 % cream  Commonly known as: AMLACTIN     cephALEXin 500 MG capsule  Commonly known as: Keflex  Take 1 capsule by mouth 2 times daily     cyclobenzaprine 10 MG tablet  Commonly known as: FLEXERIL     * diclofenac sodium 1 % Gel  Commonly known as: VOLTAREN  APPLY TO AFFECTED AREA FOUR TIMES DAILY.     * diclofenac sodium 1 % Gel  Commonly known as: VOLTAREN  Apply topically 2 times daily     fluticasone 50 MCG/ACT nasal spray  Commonly known as: FLONASE     gabapentin 300 MG capsule  Commonly known as: NEURONTIN     hydroCHLOROthiazide 25 MG tablet  Commonly known as: HYDRODIURIL     ibuprofen 800 MG tablet  Commonly known as: ADVIL;MOTRIN     ketoconazole 2 %

## 2024-08-15 NOTE — ED TRIAGE NOTES
GCS 15 pt stated that she thinks she has a kidney stone that she can't pass; pt stated that she has had a Hx of kidney stones; c/o vomiting; pt stated that 2 wks ago she had the flu, bronchitis and UTI; pt stated that she has pain when she stands to her LLQ

## 2024-08-16 VITALS
TEMPERATURE: 98.4 F | DIASTOLIC BLOOD PRESSURE: 86 MMHG | SYSTOLIC BLOOD PRESSURE: 154 MMHG | RESPIRATION RATE: 18 BRPM | WEIGHT: 260 LBS | HEIGHT: 71 IN | HEART RATE: 86 BPM | OXYGEN SATURATION: 99 % | BODY MASS INDEX: 36.4 KG/M2

## 2024-08-16 RX ORDER — ONDANSETRON 4 MG/1
4 TABLET, ORALLY DISINTEGRATING ORAL EVERY 8 HOURS PRN
Qty: 20 TABLET | Refills: 0 | Status: SHIPPED | OUTPATIENT
Start: 2024-08-16

## 2024-08-16 RX ORDER — KETOROLAC TROMETHAMINE 10 MG/1
10 TABLET, FILM COATED ORAL EVERY 6 HOURS PRN
Qty: 20 TABLET | Refills: 0 | Status: SHIPPED | OUTPATIENT
Start: 2024-08-16

## 2024-08-16 RX ORDER — CIPROFLOXACIN 500 MG/1
500 TABLET, FILM COATED ORAL 2 TIMES DAILY
Qty: 14 TABLET | Refills: 0 | Status: SHIPPED | OUTPATIENT
Start: 2024-08-16 | End: 2024-08-23

## 2024-08-16 NOTE — DISCHARGE INSTRUCTIONS
Thank you for choosing our Emergency Department for your care.  It is our privilege to care for you in your time of need.  In the next several days, you may receive a survey via email or mailed to your home about your experience with our team.  We would greatly appreciate you taking a few minutes to complete the survey, as we use this information to learn what we have done well and what we could be doing better. Thank you for trusting us with your care!    Below you will find a list of your tests from today's visit.   Labs  Recent Results (from the past 12 hour(s))   Urinalysis with Reflex to Culture    Collection Time: 08/15/24  8:31 PM    Specimen: Urine   Result Value Ref Range    Color, UA Yellow      Appearance Clear Clear      Specific Gravity, UA 1.012 1.003 - 1.030      pH, Urine 7.0 5.0 - 8.0      Protein, UA Negative Negative mg/dL    Glucose, Ur Negative Negative mg/dL    Ketones, Urine 5 (A) Negative mg/dL    Bilirubin, Urine Negative Negative      Blood, Urine Moderate (A) Negative      Urobilinogen, Urine 4.0 (H) 0.1 - 1.0 EU/dL    Nitrite, Urine Positive (A) Negative      Leukocyte Esterase, Urine Large (A) Negative      Urine Culture if Indicated Urine Culture Ordered (A) Culture not indicated by UA result      WBC, UA 20-50 0 - 4 /hpf    RBC, UA 10-20 0 - 5 /hpf    Epithelial Cells, UA Moderate (A) Few /lpf    BACTERIA, URINE Negative Negative /hpf    Mucus, UA Trace /lpf   Comprehensive Metabolic Panel    Collection Time: 08/15/24  8:31 PM   Result Value Ref Range    Sodium 139 136 - 145 mmol/L    Potassium 3.9 3.5 - 5.1 mmol/L    Chloride 109 (H) 97 - 108 mmol/L    CO2 24 21 - 32 mmol/L    Anion Gap 6 5 - 15 mmol/L    Glucose 96 65 - 100 mg/dL    BUN 15 6 - 20 mg/dL    Creatinine 1.09 (H) 0.55 - 1.02 mg/dL    BUN/Creatinine Ratio 14 12 - 20      Est, Glom Filt Rate 58 (L) >60 ml/min/1.73m2    Calcium 9.3 8.5 - 10.1 mg/dL    Total Bilirubin 1.2 (H) 0.2 - 1.0 mg/dL    AST 18 15 - 37 U/L    ALT

## 2024-08-18 LAB
BACTERIA SPEC CULT: ABNORMAL
BACTERIA SPEC CULT: ABNORMAL
COLONY COUNT, CNT: ABNORMAL
Lab: ABNORMAL